# Patient Record
Sex: MALE | Race: WHITE | Employment: FULL TIME | URBAN - METROPOLITAN AREA
[De-identification: names, ages, dates, MRNs, and addresses within clinical notes are randomized per-mention and may not be internally consistent; named-entity substitution may affect disease eponyms.]

---

## 2018-06-13 ENCOUNTER — OFFICE VISIT (OUTPATIENT)
Dept: FAMILY MEDICINE CLINIC | Age: 38
End: 2018-06-13

## 2018-06-13 ENCOUNTER — HOSPITAL ENCOUNTER (OUTPATIENT)
Dept: LAB | Age: 38
Discharge: HOME | End: 2018-06-13
Payer: COMMERCIAL

## 2018-06-13 VITALS
BODY MASS INDEX: 38.41 KG/M2 | HEART RATE: 57 BPM | SYSTOLIC BLOOD PRESSURE: 130 MMHG | TEMPERATURE: 97.7 F | RESPIRATION RATE: 17 BRPM | DIASTOLIC BLOOD PRESSURE: 80 MMHG | WEIGHT: 239 LBS | HEIGHT: 66 IN | OXYGEN SATURATION: 99 %

## 2018-06-13 DIAGNOSIS — R10.84 GENERALIZED ABDOMINAL PAIN: ICD-10-CM

## 2018-06-13 DIAGNOSIS — Z13.29 SCREENING FOR THYROID DISORDER: ICD-10-CM

## 2018-06-13 DIAGNOSIS — Z12.5 SCREENING FOR PROSTATE CANCER: ICD-10-CM

## 2018-06-13 DIAGNOSIS — Z13.1 SCREENING FOR DIABETES MELLITUS: ICD-10-CM

## 2018-06-13 DIAGNOSIS — R19.5 CHANGE IN STOOL: ICD-10-CM

## 2018-06-13 DIAGNOSIS — Z13.0 SCREENING FOR DEFICIENCY ANEMIA: ICD-10-CM

## 2018-06-13 DIAGNOSIS — Z13.220 SCREENING FOR HYPERLIPIDEMIA: ICD-10-CM

## 2018-06-13 DIAGNOSIS — Z00.00 VISIT FOR WELL MAN HEALTH CHECK: Primary | ICD-10-CM

## 2018-06-13 PROBLEM — E66.01 SEVERE OBESITY (BMI 35.0-39.9): Status: ACTIVE | Noted: 2018-06-13

## 2018-06-13 PROBLEM — J30.2 SEASONAL ALLERGIC RHINITIS: Status: ACTIVE | Noted: 2018-06-13

## 2018-06-13 PROBLEM — K21.9 GASTROESOPHAGEAL REFLUX DISEASE WITHOUT ESOPHAGITIS: Status: ACTIVE | Noted: 2018-06-13

## 2018-06-13 LAB
ALBUMIN SERPL-MCNC: 3.5 G/DL (ref 3.4–5)
ALBUMIN/GLOB SERPL: 1.1 {RATIO} (ref 0.8–1.7)
ALP SERPL-CCNC: 83 U/L (ref 45–117)
ALT SERPL-CCNC: 31 U/L (ref 16–61)
ANION GAP SERPL CALC-SCNC: 10 MMOL/L (ref 3–18)
AST SERPL-CCNC: 13 U/L (ref 15–37)
BILIRUB SERPL-MCNC: 0.4 MG/DL (ref 0.2–1)
BUN SERPL-MCNC: 21 MG/DL (ref 7–18)
BUN/CREAT SERPL: 19 (ref 12–20)
CALCIUM SERPL-MCNC: 8.5 MG/DL (ref 8.5–10.1)
CHLORIDE SERPL-SCNC: 104 MMOL/L (ref 100–108)
CHOLEST SERPL-MCNC: 177 MG/DL
CO2 SERPL-SCNC: 26 MMOL/L (ref 21–32)
CREAT SERPL-MCNC: 1.1 MG/DL (ref 0.6–1.3)
GLOBULIN SER CALC-MCNC: 3.1 G/DL (ref 2–4)
GLUCOSE SERPL-MCNC: 86 MG/DL (ref 74–99)
HCT VFR BLD AUTO: 45 % (ref 36–48)
HDLC SERPL-MCNC: 41 MG/DL (ref 40–60)
HDLC SERPL: 4.3 {RATIO} (ref 0–5)
HGB BLD-MCNC: 14.7 G/DL (ref 13–16)
LDLC SERPL CALC-MCNC: 121.2 MG/DL (ref 0–100)
LIPID PROFILE,FLP: ABNORMAL
POTASSIUM SERPL-SCNC: 4.2 MMOL/L (ref 3.5–5.5)
PROT SERPL-MCNC: 6.6 G/DL (ref 6.4–8.2)
PSA SERPL-MCNC: 0.2 NG/ML (ref 0–4)
SODIUM SERPL-SCNC: 140 MMOL/L (ref 136–145)
TRIGL SERPL-MCNC: 74 MG/DL (ref ?–150)
TSH SERPL DL<=0.05 MIU/L-ACNC: 2.15 UIU/ML (ref 0.36–3.74)
VLDLC SERPL CALC-MCNC: 14.8 MG/DL

## 2018-06-13 PROCEDURE — 84153 ASSAY OF PSA TOTAL: CPT | Performed by: NURSE PRACTITIONER

## 2018-06-13 PROCEDURE — 85018 HEMOGLOBIN: CPT | Performed by: NURSE PRACTITIONER

## 2018-06-13 PROCEDURE — 80053 COMPREHEN METABOLIC PANEL: CPT | Performed by: NURSE PRACTITIONER

## 2018-06-13 PROCEDURE — 84443 ASSAY THYROID STIM HORMONE: CPT | Performed by: NURSE PRACTITIONER

## 2018-06-13 PROCEDURE — 80061 LIPID PANEL: CPT | Performed by: NURSE PRACTITIONER

## 2018-06-13 PROCEDURE — 36415 COLL VENOUS BLD VENIPUNCTURE: CPT | Performed by: NURSE PRACTITIONER

## 2018-06-13 RX ORDER — OMEPRAZOLE 10 MG/1
10 CAPSULE, DELAYED RELEASE ORAL DAILY
COMMUNITY
End: 2018-11-14 | Stop reason: ALTCHOICE

## 2018-06-13 RX ORDER — LORATADINE 10 MG/1
10 TABLET ORAL DAILY
COMMUNITY
End: 2022-03-22 | Stop reason: ALTCHOICE

## 2018-06-13 NOTE — MR AVS SNAPSHOT
[facility] 
 
 
 [address] [address] [address] [facility] [address] 
[phone] Patient: Shaquille Newby MRN: [id number] AWH:[birth year] Visit Information Date & Time Provider Department Dept. Phone Encounter #  
 6/13/2018 11:45 AM Mike Mckeon NP Garett Santos [address] [facility] [id number] Upcoming Health Maintenance Date Due DTaP/Tdap/Td series (1 - Tdap) 7/13/2003 Influenza Age 5 to Adult 8/1/2018 Allergies as of 6/13/2018  Review Complete On: 6/13/2018 By: Mike Mckeon NP No Known Allergies Current Immunizations  Never Reviewed No immunizations on file. Not reviewed this visit You Were Diagnosed With   
  
 Codes Comments Visit for well man health check    -  Primary ICD-10-CM: Z00.00 ICD-9-CM: V70.0 Screening for prostate cancer     ICD-10-CM: Z12.5 ICD-9-CM: V76.44 Screening for deficiency anemia     ICD-10-CM: Z13.0 ICD-9-CM: V78.1 Screening for thyroid disorder     ICD-10-CM: Z13.29 ICD-9-CM: V77.0 Screening for diabetes mellitus     ICD-10-CM: Z13.1 ICD-9-CM: V77.1 Screening for hyperlipidemia     ICD-10-CM: Z13.220 ICD-9-CM: V77.91 Generalized abdominal pain     ICD-10-CM: R10.84 ICD-9-CM: 789.07 Change in stool     ICD-10-CM: R19.5 ICD-9-CM: 792.1 Vitals BP Pulse Temp Resp Height(growth percentile) Weight(growth percentile) 130/80 (BP 1 Location: Left arm, BP Patient Position: Sitting) (!) 57 97.7 °F (36.5 °C) (Oral) 17 5' 6\" (1.676 m) 239 lb (108.4 kg) SpO2 BMI Smoking Status 99% 38.58 kg/m2 Never Smoker Vitals History BMI and BSA Data Body Mass Index Body Surface Area 38.58 kg/m 2 2.25 m 2 Preferred Pharmacy Pharmacy Name Phone [address] [email or web address] [address] [facility] [place], [address] [facility] [phone] Your Updated Medication List  
  
   
 This list is accurate as of 6/13/18 12:18 PM.  Always use your most recent med list.  
  
  
  
  
 CLARITIN 10 mg tablet Generic drug:  loratadine Take 10 mg by mouth. PriLOSEC 10 mg capsule Generic drug:  omeprazole Take 10 mg by mouth daily. To-Do List   
 06/13/2018 Lab:  HGB & HCT   
  
 06/13/2018 Lab:  LIPID PANEL   
  
 06/13/2018 Lab:  METABOLIC PANEL, COMPREHENSIVE   
  
 06/13/2018 Lab:  PSA, DIAGNOSTIC (PROSTATE SPECIFIC AG)   
  
 06/13/2018 Lab:  TSH 3RD GENERATION   
  
 06/13/2018 Imaging:  US ABD COMP [facility] & HEALTH SERVICES! [employer] introduces [employer] patient portal. Now you can access parts of your medical record, email your doctor's office, and request medication refills online. 1. In your internet browser, go to [email or web address] 2. Click on the First Time User? Click Here link in the Sign In box. You will see the New Member Sign Up page. 3. Enter your [employer] Access Code exactly as it appears below. You will not need to use this code after youve completed the sign-up process. If you do not sign up before the expiration date, you must request a new code. · [employer] Access Code: [email or web address] Expires: 9/11/2018 11:48 AM 
 
4. Enter the last four digits of your Social Security Number (xxxx) and Date of Birth (mm/dd/yyyy) as indicated and click Submit. You will be taken to the next sign-up page. 5. Create a [employer] ID. This will be your [employer] login ID and cannot be changed, so think of one that is secure and easy to remember. 6. Create a [employer] password. You can change your password at any time. 7. Enter your Password Reset Question and Answer. This can be used at a later time if you forget your password. 8. Enter your e-mail address. You will receive e-mail notification when new information is available in [address] 9. Click Sign Up.  You can now view and download portions of your medical record. 10. Click the Download Summary menu link to download a portable copy of your medical information. If you have questions, please visit the Frequently Asked Questions section of the [employer] website. Remember, [employer] is NOT to be used for urgent needs. For medical emergencies, dial 911. Now available from your iPhone and Android! Please provide this summary of care documentation to your next provider. Your primary care clinician is listed as Mary Suarez. If you have any questions after today's visit, please call [phone].

## 2018-06-13 NOTE — PROGRESS NOTES
Chief Complaint   Patient presents with    Well Male     1. Have you been to the ER, urgent care clinic since your last visit? Hospitalized since your last visit? No    2. Have you seen or consulted any other health care providers outside of the [address] [place] [address] since your last visit? Include any pap smears or colon screening. No      Subjective:   Ledy Yang is a 30 y.o. male presenting for his annual checkup. ROS:  Feeling well. No dyspnea or chest pain on exertion. No abdominal pain, change in bowel habits, black or bloody stools. No urinary tract or prostatic symptoms. No neurological complaints. Specific concerns today: Pt states he urinates freq. Pt states on and off he has to go to the bathroom for a bowel movement and he gets severe cramps. He has had part of his colon removed due to diverticulitis. .    Patient Active Problem List    Diagnosis Date Noted    Severe obesity (BMI 35.0-39.9) ([facility] [address] 06/13/2018    Gastroesophageal reflux disease without esophagitis 06/13/2018    Seasonal allergic rhinitis 06/13/2018     Current Outpatient Prescriptions   Medication Sig Dispense Refill    omeprazole (PRILOSEC) 10 mg capsule Take 10 mg by mouth daily.  loratadine (CLARITIN) 10 mg tablet Take 10 mg by mouth. No Known Allergies  No past medical history on file. No past surgical history on file. No family history on file. Social History   Substance Use Topics    Smoking status: Never Smoker    Smokeless tobacco: Current User    Alcohol use No      Review of Systems   Constitutional: Negative. HENT: Negative. Eyes: Negative. Respiratory: Negative. Cardiovascular: Negative. Gastrointestinal: Positive for abdominal pain (cramps) and diarrhea. Genitourinary: Positive for frequency. Musculoskeletal: Negative. Skin: Negative. Neurological: Negative. Endo/Heme/Allergies: Negative. Psychiatric/Behavioral: Negative.            Objective:     Visit Vitals    /80 (BP 1 Location: Left arm, BP Patient Position: Sitting)    Pulse (!) 57    Temp 97.7 °F (36.5 °C)    Resp 17    Ht 5' 6\" (1.676 m)    Wt 239 lb (108.4 kg)    SpO2 99%    BMI 38.58 kg/m2     The patient appears well, alert, oriented x 3, in no distress. ENT normal.  Neck supple. No adenopathy or thyromegaly. ELOINA. Lungs are clear, good air entry, no wheezes, rhonchi or rales. S1 and S2 normal, no murmurs, regular rate and rhythm. Abdomen is soft without tenderness, guarding, mass or organomegaly. Extremities show no edema, normal peripheral pulses. Neurological is normal without focal findings. Assessment/Plan:   healthy adult male  lose weight, routine labs ordered. ICD-10-CM ICD-9-CM    1. Visit for well man health check Z00.00 V70.0    2. Screening for prostate cancer Z12.5 V76.44 PSA, DIAGNOSTIC (PROSTATE SPECIFIC AG)   3. Screening for deficiency anemia Z13.0 V78.1 HGB & HCT   4. Screening for thyroid disorder Z13.29 V77.0 TSH 3RD GENERATION   5. Screening for diabetes mellitus U91.1 N39.4 METABOLIC PANEL, COMPREHENSIVE   6. Screening for hyperlipidemia Z13.220 V77.91 LIPID PANEL   7. Generalized abdominal pain R10.84 789.07 US ABD COMP   8. Change in stool R19.5 792.1 US ABD COMP   . PLAN:  We discussed his past history of diverticulitis and surgery and whether that can cause some of his symptoms. We talked about using flomax to help with urinary symptoms. Pt given after visit summary.

## 2018-06-13 NOTE — PROGRESS NOTES
HISTORY OF PRESENT ILLNESS  Dale Roth is a 30 y.o. male.   Patient presents for well male physical.    HPI    ROS  Visit Vitals    /80 (BP 1 Location: Left arm, BP Patient Position: Sitting)    Pulse (!) 57    Temp 97.7 °F (36.5 °C) (Oral)    Resp 17    Ht 5' 6\" (1.676 m)    Wt 239 lb (108.4 kg)    SpO2 99%    BMI 38.58 kg/m2       Physical Exam    ASSESSMENT and PLAN  {ASSESSMENT/PLAN:25977}

## 2018-06-14 ENCOUNTER — HOSPITAL ENCOUNTER (OUTPATIENT)
Dept: ULTRASOUND IMAGING | Age: 38
Discharge: HOME | End: 2018-06-14
Attending: NURSE PRACTITIONER
Payer: COMMERCIAL

## 2018-06-14 DIAGNOSIS — R19.5 CHANGE IN STOOL: ICD-10-CM

## 2018-06-14 DIAGNOSIS — R10.84 GENERALIZED ABDOMINAL PAIN: ICD-10-CM

## 2018-06-14 PROCEDURE — 76700 US EXAM ABDOM COMPLETE: CPT

## 2018-06-14 NOTE — PROGRESS NOTES
Please advise Pt that his PSA is normal.  There is no signs of anemia  His TSH is in normal range. His kidney and liver functions are normal.  His cholesterol numbers are fine. His LDL is 121 which is not too bad.

## 2018-06-15 ENCOUNTER — TELEPHONE (OUTPATIENT)
Dept: FAMILY MEDICINE CLINIC | Age: 38
End: 2018-06-15

## 2018-06-15 NOTE — TELEPHONE ENCOUNTER
----- Message from Lynsey Tovar NP sent at 6/14/2018 12:13 PM EDT -----  Please advise Pt that his US is normal. His Gallbladder is normal.    Please advise Pt that his PSA is normal.   There is no signs of anemia   His TSH is in normal range. His kidney and liver functions are normal.   His cholesterol numbers are fine. His LDL is 121 which is not too bad.

## 2018-06-15 NOTE — TELEPHONE ENCOUNTER
Pt returned call and given message:    Pt verbalized understanding    ----- Message from Christian Murrieta NP sent at 6/14/2018 12:13 PM EDT -----  Please advise Pt that his US is normal. His Gallbladder is normal.     Please advise Pt that his PSA is normal.   There is no signs of anemia   His TSH is in normal range. His kidney and liver functions are normal.   His cholesterol numbers are fine.  His LDL is 121 which is not too bad.

## 2018-08-08 ENCOUNTER — OFFICE VISIT (OUTPATIENT)
Dept: FAMILY MEDICINE CLINIC | Age: 38
End: 2018-08-08

## 2018-08-08 VITALS
OXYGEN SATURATION: 100 % | DIASTOLIC BLOOD PRESSURE: 77 MMHG | BODY MASS INDEX: 38.57 KG/M2 | SYSTOLIC BLOOD PRESSURE: 125 MMHG | HEART RATE: 56 BPM | RESPIRATION RATE: 16 BRPM | WEIGHT: 240 LBS | HEIGHT: 66 IN | TEMPERATURE: 98 F

## 2018-08-08 DIAGNOSIS — M25.511 ACUTE PAIN OF RIGHT SHOULDER: ICD-10-CM

## 2018-08-08 DIAGNOSIS — F43.0 ACUTE REACTION TO STRESS: Primary | ICD-10-CM

## 2018-08-08 RX ORDER — NAPROXEN SODIUM 550 MG/1
550 TABLET ORAL 2 TIMES DAILY WITH MEALS
Qty: 30 TAB | Refills: 0 | Status: SHIPPED | OUTPATIENT
Start: 2018-08-08 | End: 2018-11-14 | Stop reason: ALTCHOICE

## 2018-08-08 RX ORDER — ESCITALOPRAM OXALATE 10 MG/1
10 TABLET ORAL DAILY
Qty: 30 TAB | Refills: 5 | Status: SHIPPED | OUTPATIENT
Start: 2018-08-08 | End: 2018-09-05 | Stop reason: SDUPTHER

## 2018-08-08 RX ORDER — ORPHENADRINE CITRATE 100 MG/1
100 TABLET, EXTENDED RELEASE ORAL 2 TIMES DAILY
Qty: 30 TAB | Refills: 0 | Status: SHIPPED | OUTPATIENT
Start: 2018-08-08 | End: 2018-11-14 | Stop reason: ALTCHOICE

## 2018-08-08 NOTE — MR AVS SNAPSHOT
[address] [address] [address] [facility] [address] 
[phone] Patient: Artie Cabrera MRN: [id number] QHO:[birth year]10 Visit Information Date & Time Provider Department Dept. Phone Encounter #  
 8/8/2018 10:45 AM IRENE Ashraf Bebo [facility] [id number] Follow-up Instructions Return in about 4 weeks (around 9/5/2018). Upcoming Health Maintenance Date Due DTaP/Tdap/Td series (1 - Tdap) 7/13/2003 Influenza Age 5 to Adult 8/1/2018 Allergies as of 8/8/2018  Review Complete On: 8/8/2018 By: Nichole Roberson LPN No Known Allergies Current Immunizations  Never Reviewed No immunizations on file. Not reviewed this visit You Were Diagnosed With   
  
 Codes Comments Acute reaction to stress    -  Primary ICD-10-CM: F43.0 ICD-9-CM: 308. 9 Acute pain of right shoulder     ICD-10-CM: M25.511 ICD-9-CM: 719.41 Vitals BP Pulse Temp Resp Height(growth percentile) Weight(growth percentile) 125/77 (BP 1 Location: Left arm, BP Patient Position: Sitting) (!) 56 98 °F (36.7 °C) (Oral) 16 5' 6\" (1.676 m) 240 lb (108.9 kg) SpO2 BMI Smoking Status 100% 38.74 kg/m2 Never Smoker BMI and BSA Data Body Mass Index Body Surface Area 38.74 kg/m 2 2.25 m 2 Preferred Pharmacy Pharmacy Name Phone [facility] Wicko [address] [place] [address], [address] [place] [address] [phone] Your Updated Medication List  
  
   
This list is accurate as of 8/8/18 11:10 AM.  Always use your most recent med list.  
  
  
  
  
 CLARITIN 10 mg tablet Generic drug:  loratadine Take 10 mg by mouth.  
  
 escitalopram oxalate 10 mg tablet Commonly known as:  Hanna María Elena Take 1 Tab by mouth daily. naproxen sodium 550 mg tablet Commonly known as:  Felipe Faith DS Take 1 Tab by mouth two (2) times daily (with meals). orphenadrine citrate 100 mg sr tablet Commonly known as:  NORFLEX Take 1 Tab by mouth two (2) times a day. PriLOSEC 10 mg capsule Generic drug:  omeprazole Take 10 mg by mouth daily. Prescriptions Sent to Pharmacy Refills  
 escitalopram oxalate (LEXAPRO) 10 mg tablet 5 Sig: Take 1 Tab by mouth daily. Class: Normal  
 Pharmacy: [address] [address] [facility], [address] [facility] Ph #: [phone] Route: Oral  
 naproxen sodium (ANAPROX DS) 550 mg tablet 0 Sig: Take 1 Tab by mouth two (2) times daily (with meals). Class: Normal  
 Pharmacy: [address], [address] [facility] Ph #: [phone] Route: Oral  
 orphenadrine citrate (NORFLEX) 100 mg sr tablet 0 Sig: Take 1 Tab by mouth two (2) times a day. Class: Normal  
 Pharmacy: [address], [address] [facility] Ph #: [phone] Route: Oral  
  
Follow-up Instructions Return in about 4 weeks (around 9/5/2018). Introducing [facility] & HEALTH SERVICES! [employer] introduces [employer] patient portal. Now you can access parts of your medical record, email your doctor's office, and request medication refills online. 1. In your internet browser, go to [email or web address] 2. Click on the First Time User? Click Here link in the Sign In box. You will see the New Member Sign Up page. 3. Enter your [employer] Access Code exactly as it appears below. You will not need to use this code after youve completed the sign-up process. If you do not sign up before the expiration date, you must request a new code. · [employer] Access Code: [email or web address] Expires: 9/11/2018 11:48 AM 
 
4. Enter the last four digits of your Social Security Number (xxxx) and Date of Birth (mm/dd/yyyy) as indicated and click Submit. You will be taken to the next sign-up page. 5. Create a [employer] ID. This will be your [employer] login ID and cannot be changed, so think of one that is secure and easy to remember. 6. Create a [employer] password. You can change your password at any time. 7. Enter your Password Reset Question and Answer. This can be used at a later time if you forget your password. 8. Enter your e-mail address. You will receive e-mail notification when new information is available in [address] 9. Click Sign Up. You can now view and download portions of your medical record. 10. Click the Download Summary menu link to download a portable copy of your medical information. If you have questions, please visit the Frequently Asked Questions section of the [employer] website. Remember, [employer] is NOT to be used for urgent needs. For medical emergencies, dial 911. Now available from your iPhone and Android! Please provide this summary of care documentation to your next provider. Your primary care clinician is listed as Yas Navarrete. If you have any questions after today's visit, please call [phone].

## 2018-08-08 NOTE — PROGRESS NOTES
HISTORY OF PRESENT ILLNESS  Martin Tran is a 41 y.o. male. Patient presents for two things  Anxiety and shoulder pain. HPI  Pt is seeing a counselor for something he witnessed at work and [employer] comp is involved. His counselor suggested that he try a medication and to talk with his PCP regarding this. He saw a man commit suicide and that has left him very unsettled. He also has right shoulder pain, especially when he makes certain movements going backwards, he sleeps with his head on his bent arm on that side. Review of Systems   Constitutional: Negative. HENT: Negative. Respiratory: Negative. Cardiovascular: Negative. Musculoskeletal: Positive for joint pain (right shoulder). Psychiatric/Behavioral: The patient is nervous/anxious. Visit Vitals    /77 (BP 1 Location: Left arm, BP Patient Position: Sitting)    Pulse (!) 56    Temp 98 °F (36.7 °C) (Oral)    Resp 16    Ht 5' 6\" (1.676 m)    Wt 240 lb (108.9 kg)    SpO2 100%    BMI 38.74 kg/m2       Physical Exam   Constitutional: He appears well-developed. No distress. Cardiovascular: Normal rate, regular rhythm and normal heart sounds. No murmur heard. Pulmonary/Chest: Effort normal and breath sounds normal. No respiratory distress. He has no wheezes. He has no rales. Musculoskeletal:        Right shoulder: He exhibits tenderness. He exhibits normal range of motion, no swelling, no effusion and no crepitus. Left shoulder: Normal.        Cervical back: He exhibits spasm. He exhibits normal range of motion. Psychiatric: His speech is normal and behavior is normal. Judgment and thought content normal. His mood appears anxious. Cognition and memory are normal.       ASSESSMENT and PLAN    ICD-10-CM ICD-9-CM    1. Acute reaction to stress F43.0 308.9 escitalopram oxalate (LEXAPRO) 10 mg tablet   2.  Acute pain of right shoulder M25.511 719.41 naproxen sodium (ANAPROX DS) 550 mg tablet      orphenadrine citrate (NORFLEX) 100 mg sr tablet     PLAN:  Right shoulder:  Pt asked to take anaprox and norflex bid for 7-10days with food. Heat therapy    Acute reaction to stress: We discussed treatment options, side effects of medication. I asked Pt to RTC in 4 weeks for med evaluation.     Pt given after visit summary  Pt is to call if there is no improvement

## 2018-08-08 NOTE — PROGRESS NOTES
Chief Complaint   Patient presents with    Shoulder Pain     right shoulder pain x 1 week    Medication Evaluation     Psych wanting patient to talk to PCP about taking a new prescription      1. Have you been to the ER, urgent care clinic since your last visit? Hospitalized since your last visit? No    2. Have you seen or consulted any other health care providers outside of the [address] [place] [address] since your last visit? Include any pap smears or colon screening.  No

## 2018-09-05 ENCOUNTER — OFFICE VISIT (OUTPATIENT)
Dept: FAMILY MEDICINE CLINIC | Age: 38
End: 2018-09-05

## 2018-09-05 VITALS
SYSTOLIC BLOOD PRESSURE: 125 MMHG | BODY MASS INDEX: 39.57 KG/M2 | HEART RATE: 57 BPM | DIASTOLIC BLOOD PRESSURE: 84 MMHG | TEMPERATURE: 97 F | OXYGEN SATURATION: 97 % | RESPIRATION RATE: 16 BRPM | HEIGHT: 66 IN | WEIGHT: 246.2 LBS

## 2018-09-05 DIAGNOSIS — F43.0 ACUTE REACTION TO STRESS: Primary | ICD-10-CM

## 2018-09-05 DIAGNOSIS — M25.511 ACUTE PAIN OF RIGHT SHOULDER: ICD-10-CM

## 2018-09-05 DIAGNOSIS — R42 VERTIGO: ICD-10-CM

## 2018-09-05 DIAGNOSIS — N52.2 DRUG-INDUCED ERECTILE DYSFUNCTION: ICD-10-CM

## 2018-09-05 DIAGNOSIS — F51.01 PRIMARY INSOMNIA: ICD-10-CM

## 2018-09-05 DIAGNOSIS — L20.84 INTRINSIC ECZEMA: ICD-10-CM

## 2018-09-05 DIAGNOSIS — J01.00 ACUTE NON-RECURRENT MAXILLARY SINUSITIS: ICD-10-CM

## 2018-09-05 RX ORDER — MECLIZINE HYDROCHLORIDE 25 MG/1
25 TABLET ORAL
Qty: 30 TAB | Refills: 0 | Status: SHIPPED | OUTPATIENT
Start: 2018-09-05 | End: 2018-09-15

## 2018-09-05 RX ORDER — CLOBETASOL PROPIONATE 0.5 MG/G
EMULSION TOPICAL 2 TIMES DAILY
Qty: 15 G | Refills: 0 | Status: SHIPPED | OUTPATIENT
Start: 2018-09-05 | End: 2018-09-05 | Stop reason: ALTCHOICE

## 2018-09-05 RX ORDER — TADALAFIL 20 MG/1
TABLET ORAL
Qty: 9 TAB | Refills: 5 | Status: SHIPPED | OUTPATIENT
Start: 2018-09-05 | End: 2019-07-31

## 2018-09-05 RX ORDER — CEFUROXIME AXETIL 500 MG/1
500 TABLET ORAL 2 TIMES DAILY
Qty: 20 TAB | Refills: 0 | Status: SHIPPED | OUTPATIENT
Start: 2018-09-05 | End: 2018-11-14 | Stop reason: ALTCHOICE

## 2018-09-05 RX ORDER — CLOBETASOL PROPIONATE 0.5 MG/G
OINTMENT TOPICAL 2 TIMES DAILY
Qty: 15 G | Refills: 0 | Status: SHIPPED | OUTPATIENT
Start: 2018-09-05 | End: 2018-09-05 | Stop reason: ALTCHOICE

## 2018-09-05 RX ORDER — CLOBETASOL PROPIONATE 0.46 MG/ML
SOLUTION TOPICAL
Qty: 50 ML | Refills: 2 | Status: SHIPPED | OUTPATIENT
Start: 2018-09-05 | End: 2018-09-17 | Stop reason: SDUPTHER

## 2018-09-05 RX ORDER — ESCITALOPRAM OXALATE 10 MG/1
10 TABLET ORAL DAILY
Qty: 90 TAB | Refills: 1 | Status: SHIPPED | OUTPATIENT
Start: 2018-09-05 | End: 2018-10-04 | Stop reason: SDUPTHER

## 2018-09-05 NOTE — PATIENT INSTRUCTIONS

## 2018-09-05 NOTE — MR AVS SNAPSHOT
[facility] 
 
 
 [address] [address] [facility] [address] 
[phone] Patient: Emily Hartley MRN: [id number] TPN:[id number]/74/[birth year]43 Visit Information Date & Time Provider Department Dept. Phone Encounter #  
 9/5/2018  8:00 AM Atilio Irwin,  [facility] [id number] Follow-up Instructions Return in about 3 months (around 12/5/2018). Upcoming Health Maintenance Date Due DTaP/Tdap/Td series (1 - Tdap) 7/13/2003 Influenza Age 5 to Adult 8/1/2018 Allergies as of 9/5/2018  Review Complete On: 9/5/2018 By: Ashlee Jaime LPN No Known Allergies Current Immunizations  Never Reviewed No immunizations on file. Not reviewed this visit You Were Diagnosed With   
  
 Codes Comments Acute reaction to stress    -  Primary ICD-10-CM: F43.0 ICD-9-CM: 308. 9 Acute pain of right shoulder     ICD-10-CM: M25.511 ICD-9-CM: 719.41 Primary insomnia     ICD-10-CM: F51.01 
ICD-9-CM: 307.42 Acute non-recurrent maxillary sinusitis     ICD-10-CM: J01.00 ICD-9-CM: 461.0 Intrinsic eczema     ICD-10-CM: L20.84 ICD-9-CM: 691.8 Vertigo     ICD-10-CM: A37 ICD-9-CM: 780.4 Drug-induced erectile dysfunction     ICD-10-CM: N52.2 ICD-9-CM: 607.84, E980.5 Vitals BP Pulse Temp Resp Height(growth percentile) Weight(growth percentile) 125/84 (BP 1 Location: Left arm) (!) 57 97 °F (36.1 °C) 16 5' 6\" (1.676 m) 246 lb 3.2 oz (111.7 kg) SpO2 BMI Smoking Status 97% 39.74 kg/m2 Never Smoker BMI and BSA Data Body Mass Index Body Surface Area 39.74 kg/m 2 2.28 m 2 Preferred Pharmacy Pharmacy Name Phone 056 [address], [address] [place] [address] [phone] Your Updated Medication List  
  
   
This list is accurate as of 9/5/18  8:32 AM.  Always use your most recent med list.  
  
  
  
  
 cefUROXime 500 mg tablet Commonly known as:  CEFTIN Take 1 Tab by mouth two (2) times a day. CLARITIN 10 mg tablet Generic drug:  loratadine Take 10 mg by mouth.  
  
 clobetasol 0.05 % ointment Commonly known as:  Bettina Dame Apply  to affected area two (2) times a day. clobetasol-emollient 0.05 % topical cream  
Commonly known as:  Susanne Lowell Apply  to affected area two (2) times a day. escitalopram oxalate 10 mg tablet Commonly known as:  Wyn Cookie Take 1 Tab by mouth daily. meclizine 25 mg tablet Commonly known as:  ANTIVERT Take 1 Tab by mouth three (3) times daily as needed for up to 10 days. naproxen sodium 550 mg tablet Commonly known as:  Rebecca Latus DS Take 1 Tab by mouth two (2) times daily (with meals). orphenadrine citrate 100 mg sr tablet Commonly known as:  NORFLEX Take 1 Tab by mouth two (2) times a day. PriLOSEC 10 mg capsule Generic drug:  omeprazole Take 10 mg by mouth daily. tadalafil 20 mg tablet Commonly known as:  CIALIS Take every 3 days Prescriptions Sent to Pharmacy Refills  
 cefUROXime (CEFTIN) 500 mg tablet 0 Sig: Take 1 Tab by mouth two (2) times a day. Class: Normal  
 Pharmacy: [facility] DR BREEZY STEEN [address] [place] [address] [facility] Ph #: [phone] Route: Oral  
 meclizine (ANTIVERT) 25 mg tablet 0 Sig: Take 1 Tab by mouth three (3) times daily as needed for up to 10 days. Class: Normal  
 Pharmacy: [facility] [address] BREEZY STEEN [address] [place] [address] [facility] Ph #: [phone] Route: Oral  
 clobetasol (TEMOVATE) 0.05 % ointment 0 Sig: Apply  to affected area two (2) times a day. Class: Normal  
 Pharmacy: [facility] DR BREEZY STEEN [address] [place] [address] [facility] Ph #: [phone] Route: Topical  
 clobetasol-emollient (TEMOVATE-E) 0.05 % topical cream 0 Sig: Apply  to affected area two (2) times a day.   
 Class: Normal  
 Pharmacy: [facility] [address] BREEZY STEEN [address] [place] [address], [address] [place] [address] ROAD Ph #: [phone] Route: Topical  
 tadalafil (CIALIS) 20 mg tablet 5 Sig: Take every 3 days Class: Normal  
 Pharmacy: [facility] [address] BREEZY STEEN [address] [place] [address], [address] [facility] Ph #: [phone]  
 escitalopram oxalate (LEXAPRO) 10 mg tablet 1 Sig: Take 1 Tab by mouth daily. Class: Normal  
 Pharmacy: [facility] [address] BREEZY STEEN [address] [place] [address], [address] [place] [address] Ph #: [phone] Route: Oral  
  
Follow-up Instructions Return in about 3 months (around 12/5/2018). Patient Instructions A Healthy Lifestyle: Care Instructions Your Care Instructions A healthy lifestyle can help you feel good, stay at a healthy weight, and have plenty of energy for both work and play. A healthy lifestyle is something you can share with your whole family. A healthy lifestyle also can lower your risk for serious health problems, such as high blood pressure, heart disease, and diabetes. You can follow a few steps listed below to improve your health and the health of your family. Follow-up care is a key part of your treatment and safety. Be sure to make and go to all appointments, and call your doctor if you are having problems. It's also a good idea to know your test results and keep a list of the medicines you take. How can you care for yourself at home? · Do not eat too much sugar, fat, or fast foods. You can still have dessert and treats now and then. The goal is moderation. · Start small to improve your eating habits. Pay attention to portion sizes, drink less juice and soda pop, and eat more fruits and vegetables. ¨ Eat a healthy amount of food. A 3-ounce serving of meat, for example, is about the size of a deck of cards. Fill the rest of your plate with vegetables and whole grains. ¨ Limit the amount of soda and sports drinks you have every day.  Drink more water when you are thirsty. ¨ Eat at least 5 servings of fruits and vegetables every day. It may seem like a lot, but it is not hard to reach this goal. A serving or helping is 1 piece of fruit, 1 cup of vegetables, or 2 cups of leafy, raw vegetables. Have an apple or some carrot sticks as an afternoon snack instead of a candy bar. Try to have fruits and/or vegetables at every meal. 
· Make exercise part of your daily routine. You may want to start with simple activities, such as walking, bicycling, or slow swimming. Try to be active 30 to 60 minutes every day. You do not need to do all 30 to 60 minutes all at once. For example, you can exercise 3 times a day for 10 or 20 minutes. Moderate exercise is safe for most people, but it is always a good idea to talk to your doctor before starting an exercise program. 
· Keep moving. Beny Keita the lawn, work in the garden, or [employer]. Take the stairs instead of the elevator at work. · If you smoke, quit. People who smoke have an increased risk for heart attack, stroke, cancer, and other lung illnesses. Quitting is hard, but there are ways to boost your chance of quitting tobacco for good. ¨ Use nicotine gum, patches, or lozenges. ¨ Ask your doctor about stop-smoking programs and medicines. ¨ Keep trying. In addition to reducing your risk of diseases in the future, you will notice some benefits soon after you stop using tobacco. If you have shortness of breath or asthma symptoms, they will likely get better within a few weeks after you quit. · Limit how much alcohol you drink. Moderate amounts of alcohol (up to 2 drinks a day for men, 1 drink a day for women) are okay. But drinking too much can lead to liver problems, high blood pressure, and other health problems. Family health If you have a family, there are many things you can do together to improve your health. · Eat meals together as a family as often as possible. · Eat healthy foods. This includes fruits, vegetables, lean meats and dairy, and whole grains. · Include your family in your fitness plan. Most people think of activities such as jogging or tennis as the way to fitness, but there are many ways you and your family can be more active. Anything that makes you breathe hard and gets your heart pumping is exercise. Here are some tips: 
¨ Walk to do errands or to take your child to school or the bus. ¨ Go for a family bike ride after dinner instead of watching TV. Where can you learn more? Go to http://gabriellaCityNewsjuany.info/. Enter Q874 in the search box to learn more about \"A Healthy Lifestyle: Care Instructions. \" Current as of: December 7, 2017 Content Version: 11.7 © 7259-0361 [employer]. Care instructions adapted under license by [employer] (which disclaims liability or warranty for this information). If you have questions about a medical condition or this instruction, always ask your healthcare professional. [address] [address] any warranty or liability for your use of this information. Introducing [facility]! [employer] introduces AquaMobile patient portal. Now you can access parts of your medical record, email your doctor's office, and request medication refills online. 1. In your internet browser, go to [email or web address] 2. Click on the First Time User? Click Here link in the Sign In box. You will see the New Member Sign Up page. 3. Enter your AquaMobile Access Code exactly as it appears below. You will not need to use this code after youve completed the sign-up process. If you do not sign up before the expiration date, you must request a new code. · AquaMobile Access Code: [email or web address] Expires: 9/11/2018 11:48 AM 
 
4. Enter the last four digits of your Social Security Number (xxxx) and Date of Birth (mm/dd/yyyy) as indicated and click Submit.  You will be taken to the next sign-up page. 5. Create a [employer] ID. This will be your [employer] login ID and cannot be changed, so think of one that is secure and easy to remember. 6. Create a [employer] password. You can change your password at any time. 7. Enter your Password Reset Question and Answer. This can be used at a later time if you forget your password. 8. Enter your e-mail address. You will receive e-mail notification when new information is available in [address]. 9. Click Sign Up. You can now view and download portions of your medical record. 10. Click the Download Summary menu link to download a portable copy of your medical information. If you have questions, please visit the Frequently Asked Questions section of the [employer] website. Remember, [employer] is NOT to be used for urgent needs. For medical emergencies, dial 911. Now available from your iPhone and Android! Please provide this summary of care documentation to your next provider. Your primary care clinician is listed as Julia Germain. If you have any questions after today's visit, please call [phone].

## 2018-09-05 NOTE — PROGRESS NOTES
Pt is here for f/u on stress related incident    1. Have you been to the ER, urgent care clinic since your last visit? Hospitalized since your last visit? No    2. Have you seen or consulted any other health care providers outside of the [address] [place] [address] since your last visit? Include any pap smears or colon screening.  No

## 2018-09-05 NOTE — PROGRESS NOTES
HISTORY OF PRESENT ILLNESS  Kan Emmanuel is a 41 y.o. male. Patient presents for follow up. HPI  Pt states he is doing much better. His depression has improved but he has noted that when he has sex, he can't finish the \"job\". He only took the medicine for his shoulder once. He knows what aggravates it so he tries not to do those things. Pt states he has a hard time falling asleep but he is not having night richards. He has a history of vertigo. He has not  had an episode since last year. He had an episode last week and wondered if the medication can cause this. He took and over the counter meclizine which makes him sleep. Usually when he wakes up the vertigo has resolved. Not this last episode. He does note he has increase post nasal congestion. Review of Systems   Constitutional: Negative. Neurological: Positive for dizziness (recent episode of vertigo, the room was spinning around. he had to go to home from work. ). Psychiatric/Behavioral: Positive for depression (improved). Visit Vitals    /84 (BP 1 Location: Left arm)    Pulse (!) 57    Temp 97 °F (36.1 °C)    Resp 16    Ht 5' 6\" (1.676 m)    Wt 246 lb 3.2 oz (111.7 kg)    SpO2 97%    BMI 39.74 kg/m2       Physical Exam   Constitutional: He is oriented to person, place, and time. He appears well-developed. No distress. HENT:   Right Ear: A middle ear effusion is present. Left Ear: A middle ear effusion is present. Nose: Right sinus exhibits maxillary sinus tenderness. Left sinus exhibits maxillary sinus tenderness. Eyes: Conjunctivae and EOM are normal. Pupils are equal, round, and reactive to light. Right eye exhibits no discharge. Left eye exhibits no discharge. Cardiovascular: Normal rate, regular rhythm and normal heart sounds. No murmur heard. Pulmonary/Chest: Breath sounds normal. No respiratory distress. He has no wheezes. He has no rales. Neurological: He is alert and oriented to person, place, and time. No cranial nerve deficit. Psychiatric: He has a normal mood and affect. His behavior is normal. Judgment and thought content normal.       ASSESSMENT and PLAN    ICD-10-CM ICD-9-CM    1. Acute reaction to stress F43.0 308.9 escitalopram oxalate (LEXAPRO) 10 mg tablet   2. Acute pain of right shoulder M25.511 719.41    3. Primary insomnia F51.01 307.42    4. Acute non-recurrent maxillary sinusitis J01.00 461.0 cefUROXime (CEFTIN) 500 mg tablet   5. Intrinsic eczema L20.84 691.8 clobetasol (TEMOVATE) 0.05 % ointment      clobetasol-emollient (TEMOVATE-E) 0.05 % topical cream   6. Vertigo R42 780.4 meclizine (ANTIVERT) 25 mg tablet   7. Drug-induced erectile dysfunction N52.2 607.84 tadalafil (CIALIS) 20 mg tablet     E980.5      PLAN:  We discussed his depression and at this time, he will continue his current dose. We discussed his ED and that the SSRIs can cause this. We discussed treatment option and whether or not his insurance will cover this or not. I asked Pt to take the anaprox and norflex for one week and if shoulder symptoms persist, the next step PT. We discussed his sleep issues and I recommend that he try over the counter Melatonin 5-10mg and take this about 1/2 hour to an hour before bedtime. I asked Pt to RTC in 3 months for chronic care.   Pt is to call sooner with any concerns    Pt given after visit summary

## 2018-09-17 DIAGNOSIS — L20.84 INTRINSIC ECZEMA: ICD-10-CM

## 2018-09-17 RX ORDER — CLOBETASOL PROPIONATE 0.46 MG/ML
SOLUTION TOPICAL
Qty: 50 ML | Refills: 2 | Status: SHIPPED | OUTPATIENT
Start: 2018-09-17 | End: 2019-10-25

## 2018-10-04 ENCOUNTER — OFFICE VISIT (OUTPATIENT)
Dept: FAMILY MEDICINE CLINIC | Age: 38
End: 2018-10-04

## 2018-10-04 VITALS
OXYGEN SATURATION: 97 % | WEIGHT: 243 LBS | HEIGHT: 65 IN | SYSTOLIC BLOOD PRESSURE: 123 MMHG | BODY MASS INDEX: 40.48 KG/M2 | DIASTOLIC BLOOD PRESSURE: 81 MMHG | HEART RATE: 55 BPM | RESPIRATION RATE: 16 BRPM | TEMPERATURE: 98.1 F

## 2018-10-04 DIAGNOSIS — F43.0 ACUTE REACTION TO STRESS: ICD-10-CM

## 2018-10-04 RX ORDER — ESCITALOPRAM OXALATE 20 MG/1
20 TABLET ORAL DAILY
Qty: 90 TAB | Refills: 1 | Status: SHIPPED | OUTPATIENT
Start: 2018-10-04 | End: 2018-12-05 | Stop reason: ALTCHOICE

## 2018-10-04 NOTE — PROGRESS NOTES
Pt is here for depression, wants medication adjusted. Scored 11 on depression screening. 1. Have you been to the ER, urgent care clinic since your last visit? Hospitalized since your last visit? No    2. Have you seen or consulted any other health care providers outside of the [address] [place] [address] since your last visit? Include any pap smears or colon screening.  Yes Dr Eliberto Litten 10/3/18

## 2018-10-04 NOTE — PROGRESS NOTES
HISTORY OF PRESENT ILLNESS  Camila White is a 41 y.o. male. Patient presents for med check  Chief Complaint   Patient presents with    Depression     HPI  Pt had a rough week last week and would like to adjust the dose. Pt states his sleep has improved with the Magnesium and Melatonin. His sexual dysfunction seems to have worked its self out. Pt's mother has a history of seizures and there is questionable history of him having epilepsy as a child. Review of Systems   Constitutional: Negative. Psychiatric/Behavioral: Positive for depression. The patient has insomnia. Visit Vitals    /81 (BP 1 Location: Left arm)    Pulse (!) 55    Temp 98.1 °F (36.7 °C) (Oral)    Resp 16    Ht 5' 5\" (1.651 m)    Wt 243 lb (110.2 kg)    SpO2 97%    BMI 40.44 kg/m2     Physical Exam   Constitutional: He is oriented to person, place, and time. He appears well-developed. No distress. Cardiovascular: Normal rate, regular rhythm and normal heart sounds. No murmur heard. Pulmonary/Chest: Effort normal and breath sounds normal. No respiratory distress. He has no wheezes. He has no rales. Neurological: He is alert and oriented to person, place, and time. He has normal reflexes. Psychiatric: He has a normal mood and affect. His behavior is normal. Judgment and thought content normal.     ASSESSMENT and PLAN    ICD-10-CM ICD-9-CM    1. Acute reaction to stress F43.0 308.9 escitalopram oxalate (LEXAPRO) 20 mg tablet     PLAN:  We discussed his symptoms and we decided to increase his medication to Lexapro 20mg  I asked Pt to RTC in 6 months for chronic care, sooner with any concerns.     Pt given after visit summary

## 2018-10-04 NOTE — PATIENT INSTRUCTIONS
Recovering From Depression: Care Instructions  Your Care Instructions    Taking good care of yourself is important as you recover from depression. In time, your symptoms will fade as your treatment takes hold. Do not give up. Instead, focus your energy on getting better. Your mood will improve. It just takes some time. Focus on things that can help you feel better, such as being with friends and family, eating well, and getting enough rest. But take things slowly. Do not do too much too soon. You will begin to feel better gradually. Follow-up care is a key part of your treatment and safety. Be sure to make and go to all appointments, and call your doctor if you are having problems. It's also a good idea to know your test results and keep a list of the medicines you take. How can you care for yourself at home? Be realistic  · If you have a large task to do, break it up into smaller steps you can handle, and just do what you can. · You may want to put off important decisions until your depression has lifted. If you have plans that will have a major impact on your life, such as marriage, divorce, or a job change, try to wait a bit. Talk it over with friends and loved ones who can help you look at the overall picture first.  · Reaching out to people for help is important. Do not isolate yourself. Let your family and friends help you. Find someone you can trust and confide in, and talk to that person. · Be patient, and be kind to yourself. Remember that depression is not your fault and is not something you can overcome with willpower alone. Treatment is necessary for depression, just like for any other illness. Feeling better takes time, and your mood will improve little by little. Stay active  · Stay busy and get outside. Take a walk, or try some other light exercise. · Talk with your doctor about an exercise program. Exercise can help with mild depression. · Go to a movie or concert.  Take part in a [employer] activity or other social gathering. Go to a [employer] game. · Ask a friend to have dinner with you. Take care of yourself  · Eat a balanced diet with plenty of fresh fruits and vegetables, whole grains, and lean protein. If you have lost your appetite, eat small snacks rather than large meals. · Avoid drinking alcohol or using illegal drugs. Do not take medicines that have not been prescribed for you. They may interfere with medicines you may be taking for depression, or they may make your depression worse. · Take your medicines exactly as they are prescribed. You may start to feel better within 1 to 3 weeks of taking antidepressant medicine. But it can take as many as 6 to 8 weeks to see more improvement. If you have questions or concerns about your medicines, or if you do not notice any improvement by 3 weeks, talk to your doctor. · If you have any side effects from your medicine, tell your doctor. Antidepressants can make you feel tired, dizzy, or nervous. Some people have dry mouth, constipation, headaches, sexual problems, or diarrhea. Many of these side effects are mild and will go away on their own after you have been taking the medicine for a few weeks. Some may last longer. Talk to your doctor if side effects are bothering you too much. You might be able to try a different medicine. · Get enough sleep. If you have problems sleeping:  ¨ Go to bed at the same time every night, and get up at the same time every morning. ¨ Keep your bedroom dark and quiet. ¨ Do not exercise after 5:00 p.m. ¨ Avoid drinks with caffeine after 5:00 p.m. · Avoid sleeping pills unless they are prescribed by the doctor treating your depression. Sleeping pills may make you groggy during the day, and they may interact with other medicine you are taking. · If you have any other illnesses, such as diabetes, heart disease, or high blood pressure, make sure to continue with your treatment.  Tell your doctor about all of the medicines you take, including those with or without a prescription. · Keep the numbers for these national suicide hotlines: 7-277-591-TALK (7-616.153.7739) and 1-495-HNRUZJD (1-602.603.7956). If you or someone you know talks about suicide or feeling hopeless, get help right away. When should you call for help? Call 911 anytime you think you may need emergency care. For example, call if:    · You feel like hurting yourself or someone else.     · Someone you know has depression and is about to attempt or is attempting suicide.   [facility] your doctor now or seek immediate medical care if:    · You hear voices.     · Someone you know has depression and:  ¨ Starts to give away his or her possessions. ¨ Uses illegal drugs or drinks alcohol heavily. ¨ Talks or writes about death, including writing suicide notes or talking about guns, knives, or pills. ¨ Starts to spend a lot of time alone. ¨ Acts very aggressively or suddenly appears calm.    Watch closely for changes in your health, and be sure to contact your doctor if:    · You do not get better as expected. Where can you learn more? Go to http://gabriella-juany.info/. Enter E652 in the search box to learn more about \"Recovering From Depression: Care Instructions. \"  Current as of: December 7, 2017  Content Version: 11.8  © 2926-6748 [employer]. Care instructions adapted under license by [employer] (which disclaims liability or warranty for this information). If you have questions about a medical condition or this instruction, always ask your healthcare professional. [place] [address] any warranty or liability for your use of this information.

## 2018-10-04 NOTE — MR AVS SNAPSHOT
[facility] 
 
 
 [address] [place] [address] [address] [address] 
[phone] Patient: Johnnie Moscoso MRN: [id number] FBT:5/60/1877 Visit Information Date & Time Provider Department Dept. Phone Encounter #  
 10/4/2018 10:20 AM Nikki Blackburn NP [address] [place] [address] [id number] Follow-up Instructions Return in about 6 months (around 4/4/2019) for chronic care. Your Appointments 12/5/2018  8:00 AM  
Office Visit with Nikki Blackburn NP [facility] (AR[facility] Proctor) Appt Note: fu on stress [address] [facility] [address] [address]  
[phone]  
  
   
 [address],  [address] Upcoming Health Maintenance Date Due DTaP/Tdap/Td series (1 - Tdap) 7/13/2003 Influenza Age 5 to Adult 8/1/2018 Allergies as of 10/4/2018  Review Complete On: 10/4/2018 By: Shira Key LPN No Known Allergies Current Immunizations  Never Reviewed No immunizations on file. Not reviewed this visit You Were Diagnosed With   
  
 Codes Comments Acute reaction to stress     ICD-10-CM: F43.0 ICD-9-CM: 308. 9 Vitals BP Pulse Temp Resp Height(growth percentile) Weight(growth percentile) 123/81 (BP 1 Location: Left arm) (!) 55 98.1 °F (36.7 °C) (Oral) 16 5' 5\" (1.651 m) 243 lb (110.2 kg) SpO2 BMI Smoking Status 97% 40.44 kg/m2 Never Smoker BMI and BSA Data Body Mass Index Body Surface Area 40.44 kg/m 2 2.25 m 2 Preferred Pharmacy Pharmacy Name Phone [facility] [address] [place] [address], [address] [place] [phone] Your Updated Medication List  
  
   
This list is accurate as of 10/4/18 11:07 AM.  Always use your most recent med list.  
  
  
  
  
 cefUROXime 500 mg tablet Commonly known as:  CEFTIN Take 1 Tab by mouth two (2) times a day. CLARITIN 10 mg tablet Generic drug:  loratadine Take 10 mg by mouth.  
  
 clobetasol 0.05 % external solution Commonly known as:  Celi Bryant Use bid to affect area (scalp) for two weeks then decrease to twice a week for maintenance  
  
 escitalopram oxalate 20 mg tablet Commonly known as:  Radhameslan Danie Take 1 Tab by mouth daily. naproxen sodium 550 mg tablet Commonly known as:  Josué Finical DS Take 1 Tab by mouth two (2) times daily (with meals). orphenadrine citrate 100 mg sr tablet Commonly known as:  NORFLEX Take 1 Tab by mouth two (2) times a day. PriLOSEC 10 mg capsule Generic drug:  omeprazole Take 10 mg by mouth daily. tadalafil 20 mg tablet Commonly known as:  CIALIS Take every 3 days Prescriptions Sent to Pharmacy Refills  
 escitalopram oxalate (LEXAPRO) 20 mg tablet 1 Sig: Take 1 Tab by mouth daily. Class: Normal  
 Pharmacy: [address] [facility] [address] #: [phone] Route: Oral  
  
Follow-up Instructions Return in about 6 months (around 4/4/2019) for chronic care. Patient Instructions Recovering From Depression: Care Instructions Your Care Instructions Taking good care of yourself is important as you recover from depression. In time, your symptoms will fade as your treatment takes hold. Do not give up. Instead, focus your energy on getting better. Your mood will improve. It just takes some time. Focus on things that can help you feel better, such as being with friends and family, eating well, and getting enough rest. But take things slowly. Do not do too much too soon. You will begin to feel better gradually. Follow-up care is a key part of your treatment and safety. Be sure to make and go to all appointments, and call your doctor if you are having problems. It's also a good idea to know your test results and keep a list of the medicines you take. How can you care for yourself at home? Be realistic · If you have a large task to do, break it up into smaller steps you can handle, and just do what you can. · You may want to put off important decisions until your depression has lifted. If you have plans that will have a major impact on your life, such as marriage, divorce, or a job change, try to wait a bit. Talk it over with friends and loved ones who can help you look at the overall picture first. 
· Reaching out to people for help is important. Do not isolate yourself. Let your family and friends help you. Find someone you can trust and confide in, and talk to that person. · Be patient, and be kind to yourself. Remember that depression is not your fault and is not something you can overcome with willpower alone. Treatment is necessary for depression, just like for any other illness. Feeling better takes time, and your mood will improve little by little. Stay active · Stay busy and get outside. Take a walk, or try some other light exercise. · Talk with your doctor about an exercise program. Exercise can help with mild depression. · Go to a movie or concert. Take part in a Zoroastrianism activity or other social gathering. Go to a ball game. · Ask a friend to have dinner with you. Take care of yourself · Eat a balanced diet with plenty of fresh fruits and vegetables, whole grains, and lean protein. If you have lost your appetite, eat small snacks rather than large meals. · Avoid drinking alcohol or using illegal drugs. Do not take medicines that have not been prescribed for you. They may interfere with medicines you may be taking for depression, or they may make your depression worse. · Take your medicines exactly as they are prescribed. You may start to feel better within 1 to 3 weeks of taking antidepressant medicine. But it can take as many as 6 to 8 weeks to see more improvement.  If you have questions or concerns about your medicines, or if you do not notice any improvement by 3 weeks, talk to your doctor. · If you have any side effects from your medicine, tell your doctor. Antidepressants can make you feel tired, dizzy, or nervous. Some people have dry mouth, constipation, headaches, sexual problems, or diarrhea. Many of these side effects are mild and will go away on their own after you have been taking the medicine for a few weeks. Some may last longer. Talk to your doctor if side effects are bothering you too much. You might be able to try a different medicine. · Get enough sleep. If you have problems sleeping: ¨ Go to bed at the same time every night, and get up at the same time every morning. ¨ Keep your bedroom dark and quiet. ¨ Do not exercise after 5:00 p.m. ¨ Avoid drinks with caffeine after 5:00 p.m. · Avoid sleeping pills unless they are prescribed by the doctor treating your depression. Sleeping pills may make you groggy during the day, and they may interact with other medicine you are taking. · If you have any other illnesses, such as diabetes, heart disease, or high blood pressure, make sure to continue with your treatment. Tell your doctor about all of the medicines you take, including those with or without a prescription. · Keep the numbers for these national suicide hotlines: 3-802-505-TALK (1-497.518.9828) and 1-627-QRQLXWN (9-363.128.8526). If you or someone you know talks about suicide or feeling hopeless, get help right away. When should you call for help? Call 911 anytime you think you may need emergency care. For example, call if: 
  · You feel like hurting yourself or someone else.  
  · Someone you know has depression and is about to attempt or is attempting suicide.  
[facility] your doctor now or seek immediate medical care if: 
  · You hear voices.  
  · Someone you know has depression and: 
¨ Starts to give away his or her possessions. ¨ Uses illegal drugs or drinks alcohol heavily. ¨ Talks or writes about death, including writing suicide notes or talking about guns, knives, or pills. ¨ Starts to spend a lot of time alone. ¨ Acts very aggressively or suddenly appears calm.  
 Watch closely for changes in your health, and be sure to contact your doctor if: 
  · You do not get better as expected. Where can you learn more? Go to http://gabriella-juany.info/. Enter U449 in the search box to learn more about \"Recovering From Depression: Care Instructions. \" Current as of: December 7, 2017 Content Version: 11.8 © 8076-1131 [employer]. Care instructions adapted under license by [employer] (which disclaims liability or warranty for this information). If you have questions about a medical condition or this instruction, always ask your healthcare professional. [address] any warranty or liability for your use of this information. Introducing [facility]! Dear Ale Jorgensen: Thank you for requesting a [employer] account. Our records indicate that you already have an active [employer] account. You can access your account anytime at [email or web address]. [email or web address] Did you know that you can access your hospital and ER discharge instructions at any time in MerchMe? You can also review all of your test results from your hospital stay or ER visit. Additional Information If you have questions, please visit the Frequently Asked Questions section of the [employer] website at [email or web address]. [email or web address] Remember, MerchMe is NOT to be used for urgent needs. For medical emergencies, dial 911. Now available from your iPhone and Android! Please provide this summary of care documentation to your next provider. Your primary care clinician is listed as Juan Ochoa. If you have any questions after today's visit, please call [phone].

## 2018-11-14 ENCOUNTER — OFFICE VISIT (OUTPATIENT)
Dept: FAMILY MEDICINE CLINIC | Age: 38
End: 2018-11-14

## 2018-11-14 ENCOUNTER — HOSPITAL ENCOUNTER (OUTPATIENT)
Dept: LAB | Age: 38
Discharge: HOME | End: 2018-11-14
Payer: COMMERCIAL

## 2018-11-14 VITALS
TEMPERATURE: 98 F | WEIGHT: 247 LBS | RESPIRATION RATE: 18 BRPM | DIASTOLIC BLOOD PRESSURE: 82 MMHG | BODY MASS INDEX: 41.15 KG/M2 | SYSTOLIC BLOOD PRESSURE: 135 MMHG | HEIGHT: 65 IN | HEART RATE: 60 BPM | OXYGEN SATURATION: 94 %

## 2018-11-14 DIAGNOSIS — F43.0 ACUTE REACTION TO STRESS: Primary | ICD-10-CM

## 2018-11-14 DIAGNOSIS — N48.30 PROLONGED ERECTION: ICD-10-CM

## 2018-11-14 DIAGNOSIS — G89.29 CHRONIC PAIN OF RIGHT KNEE: ICD-10-CM

## 2018-11-14 DIAGNOSIS — M25.561 CHRONIC PAIN OF RIGHT KNEE: ICD-10-CM

## 2018-11-14 DIAGNOSIS — M25.511 ACUTE PAIN OF RIGHT SHOULDER: ICD-10-CM

## 2018-11-14 DIAGNOSIS — K21.9 GASTROESOPHAGEAL REFLUX DISEASE WITHOUT ESOPHAGITIS: ICD-10-CM

## 2018-11-14 PROCEDURE — 36415 COLL VENOUS BLD VENIPUNCTURE: CPT

## 2018-11-14 PROCEDURE — 84146 ASSAY OF PROLACTIN: CPT

## 2018-11-14 RX ORDER — PANTOPRAZOLE SODIUM 40 MG/1
40 TABLET, DELAYED RELEASE ORAL DAILY
Qty: 90 TAB | Refills: 1 | Status: SHIPPED | OUTPATIENT
Start: 2018-11-14 | End: 2019-05-03 | Stop reason: SDUPTHER

## 2018-11-14 NOTE — PROGRESS NOTES
Chief Complaint   Patient presents with    Knee Pain     right knee on and off x 3 weeks    Heartburn     1. Have you been to the ER, urgent care clinic since your last visit? Hospitalized since your last visit? No    2. Have you seen or consulted any other health care providers outside of the [facility] since your last visit? Include any pap smears or colon screening.  No

## 2018-11-14 NOTE — PROGRESS NOTES
HISTORY OF PRESENT ILLNESS  Vilma Caldwell is a 41 y.o. male. Patient presents for heart burn issues and knee pain. Chief Complaint   Patient presents with    Knee Pain     right knee on and off x 3 weeks    Heartburn       HPI  Cabergoline used for elevated prolactin levels. Pt read an article on this. His problem is not having an erection or maintaining one, it is that he can keep an erection for a long time. His knees hurt but his right knee hurts the worse. He finds going up and down stairs hard. He takes one step at a time and holds on to a railing. Going down he takes one step at a time and turns his feet towards the railing. He does not feel like it is going to give out. He has taken over the counter Motrin 2-3 tablets at a time. He takes Prilosec but what his grandmother gets from [place]. If he doesn't take it, his stomach hurts. He likes the Lexapro. He thinks it is working well. Review of Systems   Constitutional: Negative. Respiratory: Negative. Cardiovascular: Negative. Gastrointestinal: Positive for heartburn. Negative for abdominal pain, constipation and diarrhea. Genitourinary: Negative. Musculoskeletal: Positive for joint pain (knee pain, rightworse than left.). Visit Vitals  /82 (BP 1 Location: Left arm, BP Patient Position: Sitting)   Pulse 60   Temp 98 °F (36.7 °C) (Oral)   Resp 18   Ht 5' 5\" (1.651 m)   Wt 247 lb (112 kg)   SpO2 94%   BMI 41.10 kg/m²       Physical Exam   Constitutional: He appears well-developed. No distress. Cardiovascular: Normal rate, regular rhythm and normal heart sounds. No murmur heard. Pulmonary/Chest: Effort normal and breath sounds normal. No respiratory distress. He has no wheezes. He has no rales. Musculoskeletal:        Right knee: He exhibits effusion. He exhibits normal range of motion and no erythema. Tenderness found. Left knee: Normal.   Psychiatric: He has a normal mood and affect.  His behavior is normal. Thought content normal.       ASSESSMENT and PLAN    ICD-10-CM ICD-9-CM    1. Acute reaction to stress F43.0 308.9    2. Acute pain of right shoulder M25.511 719.41 MRI SHOULDER RT WO CONT   3. Gastroesophageal reflux disease without esophagitis K21.9 530.81    4. Chronic pain of right knee M25.561 719.46 MRI KNEE RT WO CONT    G89.29 338.29    5. Prolonged erection N48.30 607.3 PROLACTIN     PLAN:  We discussed the article he read and Pt would like to have his prolactin level tested. We discussed GERD and treatment options and how to take the medication. We discussed his right shoulder and right knee pain. Next step is MRI of both. We discussed physical therapy as a treatment option. Pt has an appt 12-5-18 here for follow up. I have discussed the diagnosis with the patient and the intended plan as seen in the above orders. The patient has received an after-visit summary and questions were answered concerning future plans. I have discussed medication side effects and warnings with the patient as well. Patient will call for further questions. Follow-up Disposition:  Return if symptoms worsen or fail to improve.

## 2018-11-15 LAB — PROLACTIN SERPL-MCNC: 9.6 NG/ML

## 2018-11-19 ENCOUNTER — HOSPITAL ENCOUNTER (OUTPATIENT)
Age: 38
Discharge: HOME | End: 2018-11-19
Attending: NURSE PRACTITIONER
Payer: COMMERCIAL

## 2018-11-19 DIAGNOSIS — M25.561 CHRONIC PAIN OF RIGHT KNEE: ICD-10-CM

## 2018-11-19 DIAGNOSIS — G89.29 CHRONIC PAIN OF RIGHT KNEE: ICD-10-CM

## 2018-11-19 DIAGNOSIS — M25.511 ACUTE PAIN OF RIGHT SHOULDER: ICD-10-CM

## 2018-11-19 PROCEDURE — 73221 MRI JOINT UPR EXTREM W/O DYE: CPT

## 2018-11-19 PROCEDURE — 73721 MRI JNT OF LWR EXTRE W/O DYE: CPT

## 2018-11-25 NOTE — PROGRESS NOTES
Please advise Pt that his MRI showed small supraspinator and infraspinator tear. There is also a anterior inferior labral santana with a possible superior labral tear.

## 2018-11-28 ENCOUNTER — TELEPHONE (OUTPATIENT)
Dept: FAMILY MEDICINE CLINIC | Age: 38
End: 2018-11-28

## 2018-11-28 NOTE — TELEPHONE ENCOUNTER
----- Message from Ambrosio Perez NP sent at 11/25/2018 12:09 PM EST -----  Please advise Pt that his MRI showed small supraspinator and infraspinator tear. There is also a anterior inferior labral santana with a possible superior labral tear.

## 2018-11-28 NOTE — TELEPHONE ENCOUNTER
----- Message from Arnold Sanchez NP sent at 11/25/2018 12:13 PM EST -----  Please advise Pt that there is a small area of cartilage loss on the lateral side of his knee.

## 2018-12-05 ENCOUNTER — OFFICE VISIT (OUTPATIENT)
Dept: FAMILY MEDICINE CLINIC | Age: 38
End: 2018-12-05

## 2018-12-05 VITALS
HEART RATE: 53 BPM | SYSTOLIC BLOOD PRESSURE: 132 MMHG | DIASTOLIC BLOOD PRESSURE: 78 MMHG | BODY MASS INDEX: 42.49 KG/M2 | TEMPERATURE: 98.3 F | WEIGHT: 255 LBS | OXYGEN SATURATION: 92 % | HEIGHT: 65 IN | RESPIRATION RATE: 17 BRPM

## 2018-12-05 DIAGNOSIS — G89.29 CHRONIC PAIN OF RIGHT KNEE: ICD-10-CM

## 2018-12-05 DIAGNOSIS — M25.511 ACUTE PAIN OF RIGHT SHOULDER: ICD-10-CM

## 2018-12-05 DIAGNOSIS — N48.33 PRIAPISM, DRUG-INDUCED: ICD-10-CM

## 2018-12-05 DIAGNOSIS — F43.0 ACUTE REACTION TO STRESS: Primary | ICD-10-CM

## 2018-12-05 DIAGNOSIS — M25.561 CHRONIC PAIN OF RIGHT KNEE: ICD-10-CM

## 2018-12-05 RX ORDER — BUPROPION HYDROCHLORIDE 150 MG/1
150 TABLET ORAL
Qty: 30 TAB | Refills: 5 | Status: SHIPPED | OUTPATIENT
Start: 2018-12-05 | End: 2019-01-09 | Stop reason: SDUPTHER

## 2018-12-05 NOTE — PROGRESS NOTES
HISTORY OF PRESENT ILLNESS  Nani Kennedy is a 41 y.o. male. Patient is her for follow up. HPI  Pt is still having problems with prolonged erection. Review of Systems   Constitutional: Negative. Respiratory: Negative. Cardiovascular: Negative. Gastrointestinal: Negative. Genitourinary: Negative. Visit Vitals  /78 (BP 1 Location: Left arm, BP Patient Position: Sitting)   Pulse (!) 53   Temp 98.3 °F (36.8 °C) (Oral)   Resp 17   Ht 5' 5\" (1.651 m)   Wt 255 lb (115.7 kg)   SpO2 92%   BMI 42.43 kg/m²     Physical Exam   Constitutional: He is oriented to person, place, and time. He appears well-developed. No distress. Cardiovascular: Normal rate, regular rhythm and normal heart sounds. No murmur heard. Pulmonary/Chest: Effort normal and breath sounds normal. No respiratory distress. He has no wheezes. He has no rales. Neurological: He is alert and oriented to person, place, and time. Psychiatric: He has a normal mood and affect. His behavior is normal. Judgment and thought content normal.       ASSESSMENT and PLAN    ICD-10-CM ICD-9-CM    1. Acute reaction to stress F43.0 308.9    2. Priapism, drug-induced N48.33 607.3      E980.5    3. Acute pain of right shoulder M25.511 719.41    4. Chronic pain of right knee M25.561 719.46     G89.29 338.29      PLAN:  Together we reviewed his labs. Since the Priapism started with the start of Lexapro, the plan is to wean off the lexapro. weaning off Lexpro:  Week one: alternate 20mg every other day with a half tablet (10mg)  Week two and three: take half tablet (10mg)  Week four and five : half tablet every other day  Then stop    Start: Wellbutrin 150mg    I have discussed the diagnosis with the patient and the intended plan as seen in the above orders. The patient has received an after-visit summary and questions were answered concerning future plans. I have discussed medication side effects and warnings with the patient as well.  Patient will call for further questions. Follow up in January. Pt is to call with any concerns.

## 2018-12-05 NOTE — PATIENT INSTRUCTIONS
weaning off Lexpro:  Week one: alternate 20mg every other day with a half tablet (10mg)  Week two and three: take half tablet (10mg)  Week four and five : half tablet every other day  Then stop    Start:  Wellbutrin 150mg

## 2018-12-05 NOTE — PROGRESS NOTES
Patient is in the office today for a follow up for acute reaction to stress. 1. Have you been to the ER, urgent care clinic since your last visit? Hospitalized since your last visit? No    2. Have you seen or consulted any other health care providers outside of the [address] [place] [address] since your last visit? Include any pap smears or colon screening.  No    Learning Assessment: complete  Depression Screening: complete  Fall Risk: n/a  Abuse Screening: complete

## 2018-12-10 ENCOUNTER — OFFICE VISIT (OUTPATIENT)
Dept: ORTHOPEDIC SURGERY | Age: 38
End: 2018-12-10

## 2018-12-10 VITALS
HEIGHT: 66 IN | BODY MASS INDEX: 40.98 KG/M2 | WEIGHT: 255 LBS | SYSTOLIC BLOOD PRESSURE: 131 MMHG | DIASTOLIC BLOOD PRESSURE: 68 MMHG | TEMPERATURE: 98 F | HEART RATE: 64 BPM | OXYGEN SATURATION: 94 %

## 2018-12-10 DIAGNOSIS — M17.11 PRIMARY OSTEOARTHRITIS OF RIGHT KNEE: ICD-10-CM

## 2018-12-10 DIAGNOSIS — M75.111 INCOMPLETE TEAR OF RIGHT ROTATOR CUFF: Primary | ICD-10-CM

## 2018-12-10 RX ORDER — MELOXICAM 15 MG/1
15 TABLET ORAL
Qty: 30 TAB | Refills: 1 | Status: SHIPPED | OUTPATIENT
Start: 2018-12-10 | End: 2019-01-09 | Stop reason: SDUPTHER

## 2018-12-10 RX ORDER — TRIAMCINOLONE ACETONIDE 40 MG/ML
40 INJECTION, SUSPENSION INTRA-ARTICULAR; INTRAMUSCULAR ONCE
Qty: 1 ML | Refills: 0
Start: 2018-12-10 | End: 2018-12-10

## 2018-12-10 NOTE — PROGRESS NOTES
Camila White  1980   Chief Complaint   Patient presents with    Knee Pain     RIGHT    Shoulder Pain     RIGHT        HISTORY OF PRESENT ILLNESS  Camila White is a 41 y.o. male who presents today for evaluation of right shoulder and chronic right knee pain. Pt is referred by Kaitlyn Arriaga NP. He rates his pain 0/10 today. Pain has been present for years, worse in the past year. 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 Patient describes the pain as aching that is Intermittent in nature. Symptoms are worse with walking and standing, Activity and is better with  Rest. Associated symptoms include Swelling. Since problem started, it: has worsened. Pain does not wake patient up at night. Has taken no meds for the problem. The patient has an additional complain of shoulder pain after lifting a heavy object overhead in August 2018. Reaching across or behind exacerbate his shoulder pain. He notes pain while lying on his left arm and pain does wake him up at night. Has tried following treatments: Injections:NO; Brace:NO; Therapy:NO; Cane/Crutch:NO       No Known Allergies     History reviewed. No pertinent past medical history.    Social History     Socioeconomic History    Marital status:      Spouse name: Not on file    Number of children: Not on file    Years of education: Not on file    Highest education level: Not on file   Social Needs    Financial resource strain: Not on file    Food insecurity - worry: Not on file    Food insecurity - inability: Not on file    Transportation needs - medical: Not on file   [employer] needs - non-medical: Not on file   Occupational History    Not on file   Tobacco Use    Smoking status: Never Smoker    Smokeless tobacco: Current User   Substance and Sexual Activity    Alcohol use: No    Drug use: No    Sexual activity: Not on file   Other Topics Concern    Not on file   Social History Narrative    Not on file      History reviewed. No pertinent surgical history. History reviewed. No pertinent family history. Current Outpatient Medications   Medication Sig    buPROPion XL (WELLBUTRIN XL) 150 mg tablet Take 1 Tab by mouth every morning.  pantoprazole (PROTONIX) 40 mg tablet Take 1 Tab by mouth daily.  clobetasol (TEMOVATE) 0.05 % external solution Use bid to affect area (scalp) for two weeks then decrease to twice a week for maintenance    tadalafil (CIALIS) 20 mg tablet Take every 3 days    loratadine (CLARITIN) 10 mg tablet Take 10 mg by mouth. No current facility-administered medications for this visit. REVIEW OF SYSTEM   Patient denies: Weight loss, Fever/Chills, HA, Visual changes, Fatigue, Chest pain, SOB, Abdominal pain, N/V/D/C, Blood in stool or urine, Edema. Pertinent positive as above in HPI. All others were negative    PHYSICAL EXAM:   Visit Vitals  /68   Pulse 64   Temp 98 °F (36.7 °C)   Ht 5' 6\" (1.676 m)   Wt 255 lb (115.7 kg)   SpO2 94%   BMI 41.16 kg/m²     The patient is a well-developed, well-nourished male   in no acute distress. The patient is alert and oriented times three. The patient is alert and oriented times three. Mood and affect are normal.  LYMPHATIC: lymph nodes are not enlarged and are within normal limits  SKIN: normal in color and non tender to palpation. There are no bruises or abrasions noted. NEUROLOGICAL: Motor sensory exam is within normal limits. Reflexes are equal bilaterally.  There is normal sensation to pinprick and light touch  MUSCULOSKELETAL:  Examination Right shoulder   Skin Intact   AC joint tenderness -   Biceps tenderness -   Forward flexion/Elevation    Active abduction    Glenohumeral abduction 90   External rotation ROM 60   Internal rotation ROM 30   Apprehension -   Irmas Relocation -   Jerk -   Load and Shift -   Obriens -   Speeds -   Impingement sign +   Supraspinatus/Empty Can + External Rotation Strength -, 5/5   Lift Off/Belly Press -, 5/5   Neurovascular Intact     Examination Right knee   Skin Intact   Range of motion 0-130   Effusion +   Medial joint line tenderness -   Lateral joint line tenderness -   Tenderness Pes Bursa -   Tenderness insertion MCL -   Tenderness insertion LCL -   Qings -   Patella crepitus +   Patella grind -   Lachman -   Pivot shift -   Anterior drawer -   Posterior drawer -   Varus stress -   Valgus stress -   Neurovascular Intact   Calf Swelling and Tenderness to Palpation -   Sadiq's Test -   Hamstring Cord Tightness -     PROCEDURE: Right Shoulder Injection with Ultrasound Guidance  Indication:Right Shoulder pain/swelling    After sterile prep, 6 cc of Xylocaine and 1 cc of Kenalog were injected into the right shoulder. Ultrasound images captured using [employer] Ultrasound machine and scanned into patient's chart.        [facility] - [facility]  OFFICE PROCEDURE PROGRESS NOTE        Chart reviewed for the following:  Tabitha Grant M.D, have reviewed the History, Physical and updated the Allergic reactions for [address] performed immediately prior to start of procedure:  Tabitha Grant M.D, have performed the following reviews on Hunter Belling prior to the start of the procedure:            * Patient was identified by name and date of birth   * Agreement on procedure being performed was verified  * Risks and Benefits explained to the patient  * Procedure site verified and marked as necessary  * Patient was positioned for comfort  * Consent was signed and verified     Time: 3:32 PM     Date of procedure: 12/10/2018    Procedure performed by:  Sky Hickman M.D    Provider assisted by: (see medication administration)    How tolerated by patient: tolerated the procedure well with no complications    Comments: none      IMAGING: MRI RT SHOULDER dated 11/19/18 reviewed:  IMPRESSION:  1. Small supraspinatus/infraspinatus tear at the footplate superimposed on a background of mild tendinosis. 2. Anterior inferior labral tear. 3. Suspicious for superior labral tear. MRI RT KNEE dated 11/19/18 reviewed:  IMPRESSION:  1. Small focus of deep cartilage loss on the lateral trochlea. Otherwise, unremarkable study. IMPRESSION:      ICD-10-CM ICD-9-CM    1. Incomplete tear of right rotator cuff M75.111 840.4 meloxicam (MOBIC) 15 mg tablet      TRIAMCINOLONE ACETONIDE INJ      triamcinolone acetonide (KENALOG) 40 mg/mL injection      US GUIDE INJ/ASP/ARTHRO LG JNT/BURSA   2. Primary osteoarthritis of right knee M17.11 715.16 meloxicam (MOBIC) 15 mg tablet         PLAN:  1. The patient presents today with right shoulder and knee pain due to a partial rotator cuff tear and osteoarthritis, and I would like to try an injection in the shoulder today. Given hamstring stretches today. Risk factors include: n/a  2. No ultrasound exam indicated today  3. Yes cortisone injection indicated today R SHOULDER US  4. No Physical/Occupational Therapy indicated today  5. No diagnostic test indicated today:   6. No durable medical equipment indicated today  7. No referral indicated today   8. Yes medications indicated today: MOBIC  9. No Narcotic indicated today      RTC 3 weeks if pain continues  Follow-up Disposition: Not on File    Scribed by Louie Gambino [facility]) as dictated by Sky Hickman MD    I, Dr. Sky Hickman, confirm that all documentation is accurate.     Sky Hickman M.D.   [facility]

## 2018-12-19 ENCOUNTER — PATIENT MESSAGE (OUTPATIENT)
Dept: FAMILY MEDICINE CLINIC | Age: 38
End: 2018-12-19

## 2018-12-21 ENCOUNTER — OFFICE VISIT (OUTPATIENT)
Dept: FAMILY MEDICINE CLINIC | Age: 38
End: 2018-12-21

## 2018-12-21 VITALS
BODY MASS INDEX: 39.37 KG/M2 | WEIGHT: 245 LBS | HEART RATE: 60 BPM | OXYGEN SATURATION: 98 % | RESPIRATION RATE: 16 BRPM | SYSTOLIC BLOOD PRESSURE: 141 MMHG | TEMPERATURE: 98 F | HEIGHT: 66 IN | DIASTOLIC BLOOD PRESSURE: 84 MMHG

## 2018-12-21 DIAGNOSIS — M54.41 MIDLINE LOW BACK PAIN WITH BILATERAL SCIATICA, UNSPECIFIED CHRONICITY: Primary | ICD-10-CM

## 2018-12-21 DIAGNOSIS — M54.42 MIDLINE LOW BACK PAIN WITH BILATERAL SCIATICA, UNSPECIFIED CHRONICITY: Primary | ICD-10-CM

## 2018-12-21 RX ORDER — LANOLIN ALCOHOL/MO/W.PET/CERES
500 CREAM (GRAM) TOPICAL DAILY
COMMUNITY
End: 2019-07-31

## 2018-12-21 RX ORDER — GLUCOSAMINE/CHONDR SU A SOD 750-600 MG
TABLET ORAL
COMMUNITY
End: 2019-07-31

## 2018-12-21 RX ORDER — ORPHENADRINE CITRATE 100 MG/1
100 TABLET, EXTENDED RELEASE ORAL 2 TIMES DAILY
Qty: 30 TAB | Refills: 0 | Status: SHIPPED | OUTPATIENT
Start: 2018-12-21 | End: 2019-01-09 | Stop reason: SDUPTHER

## 2018-12-21 NOTE — PROGRESS NOTES
Chief Complaint   Patient presents with    Back Pain     1. Have you been to the ER, urgent care clinic since your last visit? Hospitalized since your last visit? No    2. Have you seen or consulted any other health care providers outside of the [address] [place] [address] since your last visit? Include any pap smears or colon screening.  No

## 2018-12-21 NOTE — LETTER
NOTIFICATION RETURN TO WORK / SCHOOL 
 
12/21/2018 3:44 PM 
 
Mr. Carri Ventura [address] [address] To Whom It May Concern: 
 
Carri Ventura is currently under the care of [address] . He was seen today for sick visit. Please excuse from work 12-23-18 and 12-24-18. If there are questions or concerns please have the patient contact our office. Sincerely, Soham Valdez NP

## 2018-12-21 NOTE — PROGRESS NOTES
HISTORY OF PRESENT ILLNESS  Liliane Sherwood is a 41 y.o. male. Patient presents for back pain. HPI  Pt thinks he thrown his back out. This was on Tuesday. He was at the [place]. He is not sure what triggered this. He has been using heat. It hurts to get up and down. Review of Systems   Constitutional: Negative. Respiratory: Negative. Cardiovascular: Negative. Genitourinary: Negative. Musculoskeletal: Positive for back pain. Visit Vitals  /84 (BP 1 Location: Left arm, BP Patient Position: Sitting)   Pulse 60   Temp 98 °F (36.7 °C) (Oral)   Resp 16   Ht 5' 6\" (1.676 m)   Wt 245 lb (111.1 kg)   SpO2 98%   BMI 39.54 kg/m²       Physical Exam   Constitutional: He appears well-developed. No distress. Cardiovascular: Normal rate, regular rhythm and normal heart sounds. No murmur heard. Pulmonary/Chest: Effort normal and breath sounds normal. No respiratory distress. He has no wheezes. He has no rales. Abdominal: There is no CVA tenderness. Musculoskeletal:        Lumbar back: He exhibits decreased range of motion, tenderness and spasm. ASSESSMENT and PLAN    ICD-10-CM ICD-9-CM    1. Midline low back pain with bilateral sciatica, unspecified chronicity M54.41 724.3 orphenadrine citrate (NORFLEX) 100 mg sr tablet    M54.42 724.2      PLAN:  Pt is on Mobic as an anti-inflammatory. Pt given Norflex to take twice a day. Recommended that he continue heat therapy. Pt is to call with any concerns. I have discussed the diagnosis with the patient and the intended plan as seen in the above orders. The patient has received an after-visit summary and questions were answered concerning future plans. I have discussed medication side effects and warnings with the patient as well. Patient will call for further questions. Follow-up Disposition:  Return if symptoms worsen or fail to improve.

## 2019-01-07 ENCOUNTER — OFFICE VISIT (OUTPATIENT)
Dept: ORTHOPEDIC SURGERY | Age: 39
End: 2019-01-07

## 2019-01-07 VITALS
RESPIRATION RATE: 16 BRPM | HEIGHT: 66 IN | DIASTOLIC BLOOD PRESSURE: 85 MMHG | WEIGHT: 251 LBS | TEMPERATURE: 96 F | SYSTOLIC BLOOD PRESSURE: 128 MMHG | OXYGEN SATURATION: 97 % | HEART RATE: 60 BPM | BODY MASS INDEX: 40.34 KG/M2

## 2019-01-07 DIAGNOSIS — M75.111 INCOMPLETE TEAR OF RIGHT ROTATOR CUFF: Primary | ICD-10-CM

## 2019-01-07 NOTE — PROGRESS NOTES
1. Have you been to the ER, urgent care clinic since your last visit? Hospitalized since your last visit? Yes When: [facility] [place] 2. Have you seen or consulted any other health care providers outside of the [address] [place] [address] since your last visit? Include any pap smears or colon screening.  No

## 2019-01-07 NOTE — PROGRESS NOTES
Inell Barthel 1980 Chief Complaint Patient presents with  Shoulder Pain RIGHT SHOULDER PAIN  
 Knee Pain RIGHT KNEE PAIN  
  
 
HISTORY OF PRESENT ILLNESS Inell Barthel is a 41 y.o. male who presents today for reevaluation of right shoulder and knee pain. Patient rates pain as 0/10 today. At last OV, patient had a cortisone injection in the shoulder which provided good relief. He can now do most activities with his arm. Pain has been present for years, worse in the past year. 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 He is back to work with no restrictions. Patient denies any fever, chills, chest pain, shortness of breath or calf pain. The remainder of the review of systems is negative. There are no new illness or injuries to report since last seen in the office. There are no changes to medications, allergies, family or social history. PHYSICAL EXAM:  
Visit Vitals /85 Pulse 60 Temp 96 °F (35.6 °C) (Oral) Resp 16 Ht 5' 6\" (1.676 m) Wt 251 lb (113.9 kg) SpO2 97% BMI 40.51 kg/m² The patient is a well-developed, well-nourished male   in no acute distress. The patient is alert and oriented times three. The patient is alert and oriented times three. Mood and affect are normal. 
LYMPHATIC: lymph nodes are not enlarged and are within normal limits SKIN: normal in color and non tender to palpation. There are no bruises or abrasions noted. NEUROLOGICAL: Motor sensory exam is within normal limits. Reflexes are equal bilaterally. There is normal sensation to pinprick and light touch MUSCULOSKELETAL: 
Examination Right shoulder Skin Intact AC joint tenderness - Biceps tenderness - Forward flexion/Elevation  Active abduction  Glenohumeral abduction 90 External rotation ROM 60 Internal rotation ROM 30 Apprehension -  
Irmas Relocation -  
Jerk - Load and Shift -  
Leopold Garcia -  
 Speeds - Impingement sign - Supraspinatus/Empty Can - External Rotation Strength -, 5/5 Lift Off/Belly Press -, 5/5 Neurovascular Intact  
  
Examination Right knee Skin Intact Range of motion 0-130 Effusion + Medial joint line tenderness - Lateral joint line tenderness - Tenderness Pes Bursa - Tenderness insertion MCL - Tenderness insertion LCL - Qings -  
Patella crepitus + Patella grind - Lachman - Pivot shift - Anterior drawer - Posterior drawer -  
Varus stress -  
Valgus stress - Neurovascular Intact Calf Swelling and Tenderness to Palpation -  
Sadiq's Test -  
Hamstring Cord Tightness - IMAGING: MRI RT SHOULDER dated 11/19/18 reviewed: 
IMPRESSION: 
1. Small supraspinatus/infraspinatus tear at the footplate superimposed on a background of mild tendinosis. 2. Anterior inferior labral tear. 3. Suspicious for superior labral tear. 
  
MRI RT KNEE dated 11/19/18 reviewed: 
IMPRESSION: 
1. Small focus of deep cartilage loss on the lateral trochlea. Otherwise, unremarkable study. 
  
 
IMPRESSION:   
  ICD-10-CM ICD-9-CM 1. Incomplete tear of right rotator cuff M75.111 840.4 PLAN:  
1. The patient presents today with right shoulder pain due to partial rotator cuff tear which was helped greatly by a cortisone injection. Risk factors include: n/a 2. No ultrasound exam indicated today 3. No cortisone injection indicated today 4. No Physical/Occupational Therapy indicated today 5. No diagnostic test indicated today: 6. No durable medical equipment indicated today 7. No referral indicated today 8. No medications indicated today: 9. No Narcotic indicated today RTC prn Follow-up Disposition: Not on File Scribed by Claudia Rod ([facility]) as dictated by MD MEHUL Lovett, Dr. Elaina Gutierrez, confirm that all documentation is accurate.  
 
SAMUEL Lovett [facility]

## 2019-01-09 ENCOUNTER — OFFICE VISIT (OUTPATIENT)
Dept: FAMILY MEDICINE CLINIC | Age: 39
End: 2019-01-09

## 2019-01-09 VITALS
HEART RATE: 73 BPM | HEIGHT: 66 IN | TEMPERATURE: 98.4 F | SYSTOLIC BLOOD PRESSURE: 123 MMHG | WEIGHT: 248 LBS | DIASTOLIC BLOOD PRESSURE: 84 MMHG | OXYGEN SATURATION: 98 % | BODY MASS INDEX: 39.86 KG/M2 | RESPIRATION RATE: 17 BRPM

## 2019-01-09 DIAGNOSIS — M79.644 CHRONIC THUMB PAIN, BILATERAL: ICD-10-CM

## 2019-01-09 DIAGNOSIS — M54.42 MIDLINE LOW BACK PAIN WITH BILATERAL SCIATICA, UNSPECIFIED CHRONICITY: Primary | ICD-10-CM

## 2019-01-09 DIAGNOSIS — G89.29 CHRONIC THUMB PAIN, BILATERAL: ICD-10-CM

## 2019-01-09 DIAGNOSIS — M17.11 PRIMARY OSTEOARTHRITIS OF RIGHT KNEE: ICD-10-CM

## 2019-01-09 DIAGNOSIS — M79.645 CHRONIC THUMB PAIN, BILATERAL: ICD-10-CM

## 2019-01-09 DIAGNOSIS — M54.41 MIDLINE LOW BACK PAIN WITH BILATERAL SCIATICA, UNSPECIFIED CHRONICITY: Primary | ICD-10-CM

## 2019-01-09 DIAGNOSIS — F43.0 ACUTE REACTION TO STRESS: ICD-10-CM

## 2019-01-09 DIAGNOSIS — M75.111 INCOMPLETE TEAR OF RIGHT ROTATOR CUFF: ICD-10-CM

## 2019-01-09 RX ORDER — ORPHENADRINE CITRATE 100 MG/1
100 TABLET, EXTENDED RELEASE ORAL 2 TIMES DAILY
Qty: 30 TAB | Refills: 1 | Status: SHIPPED | OUTPATIENT
Start: 2019-01-09 | End: 2019-07-31

## 2019-01-09 RX ORDER — MELOXICAM 15 MG/1
15 TABLET ORAL
Qty: 90 TAB | Refills: 1 | Status: SHIPPED | OUTPATIENT
Start: 2019-01-09 | End: 2022-09-06 | Stop reason: ALTCHOICE

## 2019-01-09 RX ORDER — BUPROPION HYDROCHLORIDE 150 MG/1
150 TABLET ORAL
Qty: 90 TAB | Refills: 1 | Status: SHIPPED | OUTPATIENT
Start: 2019-01-09

## 2019-01-09 NOTE — PROGRESS NOTES
HISTORY OF PRESENT ILLNESS  Semaj Gonzales is a 41 y.o. male. Patient presents for chronic care    HPI  rosaura thumbs: pain Pt has had this for years and when it is really acting up he uses his wrist splints. This started when he was in the [employer]. They hurt worse with use, to the point it can be hard to  things. Back pain: is better but bothers him when he gets up from sitting. Does not recall any injury. Wellbutrin 150mg is working. He would like a 90 day supply. Review of Systems   Constitutional: Negative. HENT: Negative. Cardiovascular: Negative. Musculoskeletal: Positive for back pain and joint pain (rosaura thumb pain, left shoulder pain improved. ). Psychiatric/Behavioral: Positive for depression (stable). Visit Vitals  /84 (BP 1 Location: Left arm, BP Patient Position: Sitting)   Pulse 73   Temp 98.4 °F (36.9 °C) (Oral)   Resp 17   Ht 5' 6\" (1.676 m)   Wt 248 lb (112.5 kg)   SpO2 98%   BMI 40.03 kg/m²     Physical Exam   Constitutional: He appears well-developed. No distress. Cardiovascular: Normal rate, regular rhythm and normal heart sounds. No murmur heard. Pulmonary/Chest: Effort normal and breath sounds normal. No respiratory distress. He has no wheezes. He has no rales. Musculoskeletal:        Right wrist: He exhibits tenderness. He exhibits normal range of motion. Left wrist: He exhibits tenderness. He exhibits normal range of motion. Right hand: He exhibits tenderness (base of the thumb). He exhibits normal range of motion, no deformity and no swelling. Normal sensation noted. Normal strength noted. Left hand: He exhibits tenderness (base of the thumb). He exhibits normal range of motion, normal capillary refill and no swelling. Normal sensation noted. Normal strength noted. Psychiatric: He has a normal mood and affect. His behavior is normal. Judgment and thought content normal.     ASSESSMENT and PLAN    ICD-10-CM ICD-9-CM    1.  Midline low back pain with bilateral sciatica, unspecified chronicity M54.41 724.3 MRI LUMB SPINE WO CONT    M54.42 724.2    2. Acute reaction to stress F43.0 308.9 buPROPion XL (WELLBUTRIN XL) 150 mg tablet   3. Incomplete tear of right rotator cuff M75.111 840.4 meloxicam (MOBIC) 15 mg tablet   4. Primary osteoarthritis of right knee M17.11 715.16 meloxicam (MOBIC) 15 mg tablet   5. Chronic thumb pain, bilateral M79.646 729.5 REFERRAL TO ORTHOPEDICS    G89.29 338.29      PLAN:  Thumbs: Wrist splints, anti-inflammatory    Back: muscle relaxer and anti-inflammatory    Pt will continue current dose of Wellbutrin. Pt ref to ortho for hands. Dif Dx: Karli Driver    Pt given script for MRI at his request to be done at the [place]    I have discussed the diagnosis with the patient and the intended plan as seen in the above orders. The patient has received an after-visit summary and questions were answered concerning future plans. I have discussed medication side effects and warnings with the patient as well. Patient will call for further questions. Follow-up Disposition:  Return in about 6 months (around 7/9/2019) for chronic care.

## 2019-01-09 NOTE — PROGRESS NOTES
Patient is in the office today for a follow up for chronic care    1. Have you been to the ER, urgent care clinic since your last visit? Hospitalized since your last visit? No    2. Have you seen or consulted any other health care providers outside of the [address] [place] [address] since your last visit? Include any pap smears or colon screening.  No

## 2019-01-17 ENCOUNTER — TELEPHONE (OUTPATIENT)
Dept: FAMILY MEDICINE CLINIC | Age: 39
End: 2019-01-17

## 2019-01-17 NOTE — TELEPHONE ENCOUNTER
Patient returned call; patient describes his arm as feeling weak and heavy. Patient denies any numbness or tingling, and reports that he does have a rotator cuff in the right shoulder that he was seeing Ortho for. Per patient, he saw ortho earlier this month and had a shot given in the shoulder. Advised patient to call ortho and follow up with them for an evaluation on this concern. Patient verbalized understanding.

## 2019-01-17 NOTE — TELEPHONE ENCOUNTER
Regarding: Non-Urgent Medical Question  Contact: [phone]  ----- Message from [address] [place] [address] 951, Generic sent at 1/17/2019  9:01 AM EST -----    I am experiencing weakness in my right arm that came on pretty sudden this morning.  I can barely hold a pencil while writing

## 2019-01-22 ENCOUNTER — OFFICE VISIT (OUTPATIENT)
Dept: ORTHOPEDIC SURGERY | Facility: CLINIC | Age: 39
End: 2019-01-22

## 2019-01-22 VITALS
HEART RATE: 76 BPM | TEMPERATURE: 96.2 F | OXYGEN SATURATION: 98 % | DIASTOLIC BLOOD PRESSURE: 85 MMHG | BODY MASS INDEX: 39.37 KG/M2 | WEIGHT: 245 LBS | RESPIRATION RATE: 16 BRPM | HEIGHT: 66 IN | SYSTOLIC BLOOD PRESSURE: 135 MMHG

## 2019-01-22 DIAGNOSIS — M18.0 ARTHRITIS OF CARPOMETACARPAL (CMC) JOINT OF BOTH THUMBS: ICD-10-CM

## 2019-01-22 DIAGNOSIS — M79.641 BILATERAL HAND PAIN: Primary | ICD-10-CM

## 2019-01-22 DIAGNOSIS — M79.642 BILATERAL HAND PAIN: Primary | ICD-10-CM

## 2019-01-22 NOTE — PROGRESS NOTES
Aubree Stephenson is a 41 y.o. 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 Worker's Compensation and legal considerations: none filed. Vitals:  
 01/22/19 1521 BP: 135/85 Pulse: 76 Resp: 16 Temp: 96.2 °F (35.7 °C) TempSrc: Oral  
SpO2: 98% Weight: 245 lb (111.1 kg) Height: 5' 6\" (1.676 m) PainSc:   0 - No pain PainLoc: Finger Chief Complaint Patient presents with  Thumb Pain B/L thumb pain HPI: Patient comes in today with complaints of bilateral thumb weakness. He reports to have some pain however minimal.  He localizes pain to the base of the thumb. He denies any injuries. He reports his biggest complaint to be after he is used his hand a lot not been able to get a good  on things. He also reports cracking his joint at the base of his thumb every day. Date of onset: For a few years worsening recently Injury: No 
 
Prior Treatment:  No 
 
Numbness/ Tingling: No 
 
ROS: Review of Systems - General ROS: negative Respiratory ROS: no cough, shortness of breath, or wheezing Cardiovascular ROS: no chest pain or dyspnea on exertion Musculoskeletal ROS: positive for - pain in hand - bilateral 
Neurological ROS: negative Dermatological ROS: negative History reviewed. No pertinent past medical history. History reviewed. No pertinent surgical history. Current Outpatient Medications Medication Sig Dispense Refill  triamcinolone acetonide (KENALOG) 10 mg/mL injection 1 mL by IntraBURSal route once for 1 dose. 1 Vial 0  
 buPROPion XL (WELLBUTRIN XL) 150 mg tablet Take 1 Tab by mouth every morning. 90 Tab 1  
 meloxicam (MOBIC) 15 mg tablet Take 1 Tab by mouth daily (with breakfast). 90 Tab 1  
 orphenadrine citrate (NORFLEX) 100 mg sr tablet Take 1 Tab by mouth two (2) times a day. 30 Tab 1  cyanocobalamin (VITAMIN B12) 500 mcg tablet Take 500 mcg by mouth daily.     
 glucosamine/chondr starr A sod (GLUCOSAMINE-CHONDROITIN) 750-600 mg tab Take  by mouth.  pantoprazole (PROTONIX) 40 mg tablet Take 1 Tab by mouth daily. 90 Tab 1  
 tadalafil (CIALIS) 20 mg tablet Take every 3 days 9 Tab 5  loratadine (CLARITIN) 10 mg tablet Take 10 mg by mouth.  clobetasol (TEMOVATE) 0.05 % external solution Use bid to affect area (scalp) for two weeks then decrease to twice a week for maintenance 50 mL 2 No Known Allergies PE:  
 
Hand: There is no tenderness over the first [facility] joint or over the STT joint laterally. The patient does actively pop these joints however during exam. 
 
Examination L Digit(s) R Digit(s) 1st CMC Tenderness -  -   
1st CMC Grind -  - Priscilla Nodes -  -   
Heberden Nodes -  -   
A1 Pulley Tenderness -  - Triggering -  -   
UCL Instability -  -   
RCL Instability -  - Lateral Stress Pain -  -   
Palmar Cords -  - Tabletop test -  -   
Garrod's Pads -  -   
 Strength Pinch Strength      
 
ROM: Full Imaging: plain films of bilateral hands and cmc joints show only mild degenerative changes ICD-10-CM ICD-9-CM 1. Bilateral hand pain M79.641 729.5 AMB POC XRAY, HAND; 3+ VIEWS  
 M79.642  AMB POC XRAY, HAND; 3+ VIEWS  
   AMB SUPPLY ORDER  
   triamcinolone acetonide (KENALOG) 10 mg/mL injection DRAIN/INJECT SMALL JOINT/BURSA TRIAMCINOLONE ACETONIDE INJ 2. Arthritis of carpometacarpal (CMC) joint of both thumbs M18.11 716.94 AMB POC XRAY, HAND; 3+ VIEWS  
 M18.12  AMB POC XRAY, HAND; 3+ VIEWS  
   AMB SUPPLY ORDER  
   triamcinolone acetonide (KENALOG) 10 mg/mL injection DRAIN/INJECT SMALL JOINT/BURSA TRIAMCINOLONE ACETONIDE INJ Plan:  
 
Given the patient's history of symptoms, we will treat this as a CMC arthropathy. He does not have any the provocative test on exam. 
 
Bilateral cool comfort braces in bilateral thumb CMC injections Follow-up PRN no sooner than 2 months I have also instructed the patient to stop cracking his thumb joint knuckles. [facility] PROCEDURE PROGRESS NOTE Chart reviewed for the following: 
 Narendra CARVER DO, have reviewed the History, Physical and updated the Allergic reactions for Dale Roth TIME OUT performed immediately prior to start of procedure: 
 Narendra CARVER DO, have performed the following reviews on Dale Roth prior to the start of the procedure: 
         
* Patient was identified by name and date of birth * Agreement on procedure being performed was verified * Risks and Benefits explained to the patient * Procedure site verified and marked as necessary * Patient was positioned for comfort * Consent was signed and verified Time: 15:40 Date of procedure: 1/22/2019 Procedure performed by: Cherelle Sommer DO 
 
Provider assisted by: Maykel Ortiz LPN Patient assisted by: self How tolerated by patient: tolerated the procedure well with no complications Post Procedural Pain Scale: 0 - No Hurt Comments: none Procedure: After consent was obtained, using sterile technique the joint was prepped. Local anesthetic used: 1% lidocaine. Kenalog 5 mg X2 and was then injected and the needle withdrawn. The procedure was well tolerated. The patient is asked to continue to rest the area for a few more days before resuming regular activities. It may be more painful for the first 1-2 days. Watch for fever, or increased swelling or persistent pain in the joint. Call or return to clinic prn if such symptoms occur or there is failure to improve as anticipated. Plan was reviewed with patient, who verbalized agreement and understanding of the plan

## 2019-01-22 NOTE — PATIENT INSTRUCTIONS
Learning About Arthritis at the [facility] of the Thumb What is it? Arthritis at the base of the thumb joint is wear and tear on the cartilage. Cartilage is a firm, thick, slippery tissue. It covers and protects the ends of bones where they meet to form a joint. When you have arthritis, there are changes in the cartilage that cause it to break down. The bones rub together and cause joint damage and pain. What causes it? Experts don't know what causes arthritis at the base of the thumb. But aging, a lot of use, an injury, or family history may play a part. What are the symptoms? Symptoms of arthritis at the base of the thumb include aching in your joint. Or the pain may feel burning or sharp. You may feel clicking, creaking, or catching in the joint. It may get stiff. You may have more pain and less strength when you pinch or  things. Symptoms may come and go, stay the same, or get worse over time. How is it diagnosed? Your doctor can often diagnose arthritis by asking you questions about your joint pain and other symptoms and examining you. You may also have X-rays and blood tests. Blood tests can help make sure another disease isn't causing your symptoms. How is it treated? Arthritis at the base of your thumb may be treated with rest, pain relievers, steroid medicines, using a brace or splint, andin some casessurgery. To help relieve pain in the joint, rest your sore hand. Switch hands for some activities. You can try heat and cold therapy, such as hot compresses, paraffin wax, cold packs, or ice massage. Your doctor may give you a splint to wear during some activities or when pain flares up. You can often manage mild or moderate arthritis pain with over-the-counter pain relievers. These include medicines that reduce swelling, such as ibuprofen or naproxen. You can also use acetaminophen. Sometimes these medicines are in creams that you can rub on your thumb and hand.  Your doctor may also prescribe other medicine for your pain. For some people, steroid shots may be an option. If none of the treatments work, your doctor may discuss surgery with you. Follow-up care is a key part of your treatment and safety. Be sure to make and go to all appointments, and call your doctor if you are having problems. It's also a good idea to know your test results and keep a list of the medicines you take. Where can you learn more? Go to http://gabriella-juany.info/. Enter T110 in the search box to learn more about \"Learning About Arthritis at the [facility] of the Thumb. \" Current as of: Megan 10, 2018 Content Version: 11.9 © 6327-2411 [employer]. Care instructions adapted under license by [employer] (which disclaims liability or warranty for this information). If you have questions about a medical condition or this instruction, always ask your healthcare professional. [address] any warranty or liability for your use of this information.

## 2019-03-22 ENCOUNTER — OFFICE VISIT (OUTPATIENT)
Dept: FAMILY MEDICINE CLINIC | Age: 39
End: 2019-03-22

## 2019-03-22 ENCOUNTER — HOSPITAL ENCOUNTER (OUTPATIENT)
Dept: LAB | Age: 39
Discharge: HOME | End: 2019-03-22
Payer: COMMERCIAL

## 2019-03-22 VITALS
SYSTOLIC BLOOD PRESSURE: 131 MMHG | RESPIRATION RATE: 17 BRPM | TEMPERATURE: 98 F | OXYGEN SATURATION: 98 % | HEART RATE: 58 BPM | BODY MASS INDEX: 37.45 KG/M2 | WEIGHT: 233 LBS | DIASTOLIC BLOOD PRESSURE: 76 MMHG | HEIGHT: 66 IN

## 2019-03-22 DIAGNOSIS — M25.50 ARTHRALGIA, UNSPECIFIED JOINT: Primary | ICD-10-CM

## 2019-03-22 DIAGNOSIS — E66.01 SEVERE OBESITY WITH BODY MASS INDEX (BMI) OF 35.0 TO 39.9 WITH SERIOUS COMORBIDITY (HCC): ICD-10-CM

## 2019-03-22 DIAGNOSIS — M25.50 ARTHRALGIA, UNSPECIFIED JOINT: ICD-10-CM

## 2019-03-22 PROCEDURE — 36415 COLL VENOUS BLD VENIPUNCTURE: CPT

## 2019-03-22 PROCEDURE — 86225 DNA ANTIBODY NATIVE: CPT

## 2019-03-22 NOTE — PROGRESS NOTES
Chief Complaint Patient presents with  Weight Management  
  diet 1. Have you been to the ER, urgent care clinic since your last visit? Hospitalized since your last visit? NO 
 
2. Have you seen or consulted any other health care providers outside of the [address] [place] [address] since your last visit? Include any pap smears or colon screening.  No

## 2019-03-22 NOTE — PROGRESS NOTES
HISTORY OF PRESENT ILLNESS Dale Roth is a 41 y.o. male. Patient presents concerning weight. HPI Pt states he is working on losing weight and wants to know if the keto diet is ok or is it doing harm. He has lost 20lbs so far he says, his wife is also doing the diet with him. He thinks his goal weight is 150lbs Pt states for a very long time, at night he has to rotate his wrist and ankles to \"losen\" them up. This irritates his wife. They just feels \"tight\". Review of Systems Constitutional: Positive for weight loss (intentionally). Negative for chills and fever. HENT: Negative. Respiratory: Negative. Cardiovascular: Negative. Gastrointestinal: Negative. Genitourinary: Negative. Visit Vitals /76 (BP 1 Location: Left arm, BP Patient Position: Sitting) Pulse (!) 58 Temp 98 °F (36.7 °C) (Oral) Resp 17 Ht 5' 6\" (1.676 m) Wt 233 lb (105.7 kg) SpO2 98% BMI 37.61 kg/m² Physical Exam  
Constitutional: He is oriented to person, place, and time. He appears well-developed. No distress. Cardiovascular: Normal rate, regular rhythm and normal heart sounds. No murmur heard. Pulmonary/Chest: Effort normal and breath sounds normal. No respiratory distress. He has no wheezes. He has no rales. Neurological: He is alert and oriented to person, place, and time. No cranial nerve deficit. ASSESSMENT and PLAN 
  ICD-10-CM ICD-9-CM 1. Arthralgia, unspecified joint M25.50 719.40 MACK COMPREHENSIVE PANEL 2. Severe obesity with body mass index (BMI) of 35.0 to 39.9 with serious comorbidity (HCC) E66.01 278.01   
 
PLAN: 
We spent greater than 30 minutes going over diets. We discussed his BMI range and what would be ideal. 
We discussed diet and exercise. We discussed the history of the Keto diet and what it is being used for now, ie. Some seizure disorders.  We discussed other diets such as the Mediterranean diet and patient was given written info on this as well as low carb info. We discussed his thumb issues. He has gotten injections and will follow up with the specialist. 
 
I have discussed the diagnosis with the patient and the intended plan as seen in the above orders. The patient has received an after-visit summary and questions were answered concerning future plans. I have discussed medication side effects and warnings with the patient as well. Patient will call for further questions.

## 2019-03-25 LAB
CENTROMERE B AB SER-ACNC: <0.2 AI (ref 0–0.9)
CHROMATIN AB SERPL-ACNC: <0.2 AI (ref 0–0.9)
DSDNA AB SER-ACNC: <1 IU/ML (ref 0–9)
ENA JO1 AB SER-ACNC: <0.2 AI (ref 0–0.9)
ENA RNP AB SER-ACNC: <0.2 AI (ref 0–0.9)
ENA SCL70 AB SER-ACNC: <0.2 AI (ref 0–0.9)
ENA SM AB SER-ACNC: <0.2 AI (ref 0–0.9)
ENA SS-A AB SER-ACNC: <0.2 AI (ref 0–0.9)
ENA SS-B AB SER-ACNC: <0.2 AI (ref 0–0.9)
SEE BELOW, 164869: NORMAL

## 2019-03-25 NOTE — PROGRESS NOTES
Please advise Pt that his MACK test is negative. This is just a basic way to look at auto immune disorders.

## 2019-03-26 ENCOUNTER — OFFICE VISIT (OUTPATIENT)
Dept: ORTHOPEDIC SURGERY | Facility: CLINIC | Age: 39
End: 2019-03-26

## 2019-03-26 VITALS
DIASTOLIC BLOOD PRESSURE: 78 MMHG | WEIGHT: 239.4 LBS | OXYGEN SATURATION: 97 % | HEART RATE: 59 BPM | BODY MASS INDEX: 38.47 KG/M2 | TEMPERATURE: 97.1 F | SYSTOLIC BLOOD PRESSURE: 121 MMHG | RESPIRATION RATE: 18 BRPM | HEIGHT: 66 IN

## 2019-03-26 DIAGNOSIS — M19.031 ARTHRITIS OF RIGHT WRIST: Primary | ICD-10-CM

## 2019-03-26 DIAGNOSIS — M19.032 ARTHRITIS OF LEFT WRIST: ICD-10-CM

## 2019-03-26 DIAGNOSIS — M18.0 ARTHRITIS OF CARPOMETACARPAL (CMC) JOINT OF BOTH THUMBS: ICD-10-CM

## 2019-03-26 NOTE — PROGRESS NOTES
Lonell  is a 41 y.o. 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 Worker's Compensation and legal considerations: none filed. Vitals:  
 03/26/19 1450 BP: 121/78 Pulse: (!) 59 Resp: 18 Temp: 97.1 °F (36.2 °C) TempSrc: Oral  
SpO2: 97% Weight: 239 lb 6.4 oz (108.6 kg) Height: 5' 6\" (1.676 m) PainSc:   0 - No pain PainLoc: Hand Chief Complaint Patient presents with  
 Hand Pain Ze HPI: Patient comes in today for follow-up of bilateral thumb pain. He was previously injected a couple months ago and his CMC joints of bilateral thumbs. He said this briefly helped but overall he is continuing to have pain with certain activities especially gripping type of activities. He denies any injuries. He denies any pain today at rest. 
 
Initial HPI: Patient comes in today with complaints of bilateral thumb weakness. He reports to have some pain however minimal.  He localizes pain to the base of the thumb. He denies any injuries. He reports his biggest complaint to be after he is used his hand a lot not been able to get a good  on things. He also reports cracking his joint at the base of his thumb every day. Date of onset: For a few years worsening recently Injury: No 
 
Prior Treatment:  No 
 
Numbness/ Tingling: No 
 
ROS: Review of Systems - General ROS: negative Respiratory ROS: no cough, shortness of breath, or wheezing Cardiovascular ROS: no chest pain or dyspnea on exertion Musculoskeletal ROS: positive for - pain in hand - bilateral 
Neurological ROS: negative Dermatological ROS: negative History reviewed. No pertinent past medical history. History reviewed. No pertinent surgical history. Current Outpatient Medications Medication Sig Dispense Refill  triamcinolone acetonide (KENALOG) 10 mg/mL injection 1 mL by IntraMUSCular route once for 1 dose.  1 Vial 0  
 triamcinolone acetonide (KENALOG) 10 mg/mL injection 1 mL by IntraMUSCular route once for 1 dose. 1 Vial 0  
 OTHER Indications: Super Beet extract  buPROPion XL (WELLBUTRIN XL) 150 mg tablet Take 1 Tab by mouth every morning. 90 Tab 1  cyanocobalamin (VITAMIN B12) 500 mcg tablet Take 500 mcg by mouth daily.  glucosamine/chondr starr A sod (GLUCOSAMINE-CHONDROITIN) 750-600 mg tab Take  by mouth.  pantoprazole (PROTONIX) 40 mg tablet Take 1 Tab by mouth daily. 90 Tab 1  
 loratadine (CLARITIN) 10 mg tablet Take 10 mg by mouth.  meloxicam (MOBIC) 15 mg tablet Take 1 Tab by mouth daily (with breakfast). 90 Tab 1  
 orphenadrine citrate (NORFLEX) 100 mg sr tablet Take 1 Tab by mouth two (2) times a day. 30 Tab 1  clobetasol (TEMOVATE) 0.05 % external solution Use bid to affect area (scalp) for two weeks then decrease to twice a week for maintenance 50 mL 2  
 tadalafil (CIALIS) 20 mg tablet Take every 3 days 9 Tab 5 No Known Allergies PE:  
 
Hand: Today tenderness to palpation is not localized over the Aia 16 joints bilaterally but more proximal about the STT joint. However after significant manipulation of his Aia 16 joint she does have some pain there. Examination L Digit(s) R Digit(s) 1st CMC Tenderness ?+  ?+ 1st [address] -  - Priscilla Nodes -  -   
[place] Nodes -  -   
A1 Pulley Tenderness -  - Triggering -  -   
UCL Instability -  -   
RCL Instability -  - Lateral Stress Pain -  -   
Palmar Cords -  - Tabletop test -  -   
Garrod's Pads -  -   
 Strength Pinch Strength      
 
ROM: Full Imaging:  
 
Previous plain films of bilateral hands and cmc joints show only mild degenerative changes ICD-10-CM ICD-9-CM 1. Arthritis of right wrist M19.031 716.93 DRAIN/INJECT SMALL JOINT/BURSA TRIAMCINOLONE ACETONIDE INJ  
   triamcinolone acetonide (KENALOG) 10 mg/mL injection 2.  Arthritis of left wrist M19.032 716.93 TRIAMCINOLONE ACETONIDE INJ  
 triamcinolone acetonide (KENALOG) 10 mg/mL injection DRAIN/INJECT SMALL JOINT/BURSA 3. Arthritis of carpometacarpal (CMC) joint of both thumbs M18.11 716.94 DRAIN/INJECT SMALL JOINT/BURSA  
 M18.12  TRIAMCINOLONE ACETONIDE INJ  
   triamcinolone acetonide (KENALOG) 10 mg/mL injection TRIAMCINOLONE ACETONIDE INJ  
   triamcinolone acetonide (KENALOG) 10 mg/mL injection DRAIN/INJECT SMALL JOINT/BURSA Plan:  
 
Bilateral thumb joint injections at the STT joints. We will also try to inject the [facility] joints with this injection. I have instructed him to continue wearing his cool comfort braces bilaterally. He has not been wearing it at work due to wanting to get it dirty. He says he will try to wear it underneath his gloves. If these injections are not helpful may have to consider either further advanced imaging or different type of x-ray. Follow-up PRN. [facility] PROCEDURE PROGRESS NOTE Chart reviewed for the following: 
 Narendra CARVER DO, have reviewed the History, Physical and updated the Allergic reactions for Dale Roth TIME OUT performed immediately prior to start of procedure: 
 Narendra CARVER DO, have performed the following reviews on Betmaya Mckeonk prior to the start of the procedure: 
         
* Patient was identified by name and date of birth * Agreement on procedure being performed was verified * Risks and Benefits explained to the patient * Procedure site verified and marked as necessary * Patient was positioned for comfort * Consent was signed and verified Time: 15:40 Date of procedure: 3/26/2019 Procedure performed by: Cherelle Sommer DO 
 
Provider assisted by: Maykel Ortiz LPN Patient assisted by: self How tolerated by patient: tolerated the procedure well with no complications Post Procedural Pain Scale: 0 - No Hurt Comments: none Procedure: After consent was obtained, using sterile technique the joint was prepped. Local anesthetic used: 1% lidocaine. Kenalog 5 mg X2 and was then injected and the needle withdrawn. The procedure was well tolerated. The patient is asked to continue to rest the area for a few more days before resuming regular activities. It may be more painful for the first 1-2 days. Watch for fever, or increased swelling or persistent pain in the joint. Call or return to clinic prn if such symptoms occur or there is failure to improve as anticipated. Plan was reviewed with patient, who verbalized agreement and understanding of the plan

## 2019-03-27 ENCOUNTER — PATIENT MESSAGE (OUTPATIENT)
Dept: FAMILY MEDICINE CLINIC | Age: 39
End: 2019-03-27

## 2019-04-08 ENCOUNTER — TELEPHONE (OUTPATIENT)
Dept: FAMILY MEDICINE CLINIC | Age: 39
End: 2019-04-08

## 2019-04-08 NOTE — TELEPHONE ENCOUNTER
Pt calling because he received a my chart message that he has an appt with Dr Bautista Patient Neurology on 05/15/2019.      Pt has no knowledge of why he is being scheduled with neurology and wants to be sure S Vinicio didn't request it before he calls their office

## 2019-04-11 NOTE — TELEPHONE ENCOUNTER
Adelina Godoy   to Riley Jackson NP            6:36 PM   For some reason an appointment got scheduled for me yesterday with a neurologist on May 15th I believe. I didn't request this, and didn't know if it was a mistake or if you wanted me to see him    April 10, 2019              9:00 AM   Zev GONZALES routed this conversation to IRENE Mercado NP   to Adelina Godoy            9:15 AM   Yes, this is for the carpal tunnel symptoms. They are the ones who do the testing. Sangeetha      Last read by Sandoval Almaraz at 10:19 AM on 4/10/2019.    Adelina Godoy   to Riley Jackson NP            9:17 AM     [id number] Jessa Kang, thank you

## 2019-05-03 RX ORDER — PANTOPRAZOLE SODIUM 40 MG/1
TABLET, DELAYED RELEASE ORAL
Qty: 90 TAB | Refills: 1 | Status: SHIPPED | OUTPATIENT
Start: 2019-05-03

## 2019-05-15 ENCOUNTER — OFFICE VISIT (OUTPATIENT)
Dept: NEUROLOGY | Age: 39
End: 2019-05-15

## 2019-05-15 VITALS
DIASTOLIC BLOOD PRESSURE: 82 MMHG | WEIGHT: 241 LBS | HEART RATE: 59 BPM | HEIGHT: 66 IN | BODY MASS INDEX: 38.73 KG/M2 | RESPIRATION RATE: 16 BRPM | TEMPERATURE: 97.7 F | SYSTOLIC BLOOD PRESSURE: 130 MMHG | OXYGEN SATURATION: 98 %

## 2019-05-15 DIAGNOSIS — G47.10 HYPERSOMNIA: ICD-10-CM

## 2019-05-15 DIAGNOSIS — R41.89 COGNITIVE IMPAIRMENT: ICD-10-CM

## 2019-05-15 DIAGNOSIS — G47.8 UPPER AIRWAY RESISTANCE SYNDROME: ICD-10-CM

## 2019-05-15 DIAGNOSIS — M25.549 ARTHRALGIA OF HAND, UNSPECIFIED LATERALITY: ICD-10-CM

## 2019-05-15 DIAGNOSIS — G56.03 BILATERAL CARPAL TUNNEL SYNDROME: Primary | ICD-10-CM

## 2019-05-15 DIAGNOSIS — E66.01 MORBID OBESITY (HCC): ICD-10-CM

## 2019-05-15 NOTE — PROGRESS NOTES
ROOM # 4    Gilberto Moreno presents today for   Chief Complaint   Patient presents with   [address] Memory Loss       Gilberto Moreno preferred language for health care discussion is english/other. Depression Screening:  3 most recent [facility] 36 Screens 3/26/2019 1/22/2019 1/7/2019 12/21/2018 10/4/2018 8/8/2018 6/13/2018   PHQ Not Done Active Diagnosis of Depression or Bipolar Disorder - - - - - -   Little interest or pleasure in doing things Not at all Not at all Not at all Several days Several days Not at all Not at all   Feeling down, depressed, irritable, or hopeless Not at all Not at all Not at all Several days Several days Not at all Not at all   Total Score PHQ 2 0 0 0 2 2 0 0   Trouble falling or staying asleep, or sleeping too much - - - - More than half the days - -   Feeling tired or having little energy - - - - Nearly every day - -   Poor appetite, weight loss, or overeating - - - - Not at all - -   Feeling bad about yourself - or that you are a failure or have let yourself or your family down - - - - More than half the days - -   Trouble concentrating on things such as school, work, reading, or watching TV - - - - Not at all - -   Moving or speaking so slowly that other people could have noticed; or the opposite being so fidgety that others notice - - - - More than half the days - -   Thoughts of being better off dead, or hurting yourself in some way - - - - Not at all - -   PHQ 9 Score - - - - 11 - -   How difficult have these problems made it for you to do your work, take care of your home and get along with others - - - - Not difficult at all - -       Learning Assessment:  Learning Assessment 6/13/2018   PRIMARY LEARNER Patient   PRIMARY LANGUAGE ENGLISH   LEARNER PREFERENCE PRIMARY READING     LISTENING     DEMONSTRATION   ANSWERED BY patient   RELATIONSHIP SELF       Abuse Screening:  Abuse Screening Questionnaire 6/13/2018   Do you ever feel afraid of your partner?  N   Are you in a relationship with someone who physically or mentally threatens you? N   Is it safe for you to go home? Y       Fall Risk  No flowsheet data found. Visit Vitals  /82 (BP 1 Location: Left arm, BP Patient Position: Sitting)   Pulse (!) 59   Temp 97.7 °F (36.5 °C) (Oral)   Resp 16   Ht 5' 6\" (1.676 m)   Wt 241 lb (109.3 kg)   SpO2 98%   BMI 38.90 kg/m²       Health Maintenance reviewed and discussed per provider. Yes    Sidney Kendrick is due for   Health Maintenance Due   Topic Date Due    DTaP/Tdap/Td series (1 - Tdap) 07/13/2003     Please order/place referral if appropriate. Advance Directive:  1. Do you have an advance directive in place? Patient Reply: No    2. If not, would you like material regarding how to put one in place? Patient Reply: No    Coordination of Care:  1. Have you been to the ER, urgent care clinic since your last visit? Hospitalized since your last visit?  No

## 2019-05-15 NOTE — LETTER
5/15/19 Patient: Darline Bran YOB: 1980 Date of Visit: 5/15/2019 Estela Keller NP 
[address] [facility] [id number] [facility] [address] VIA In Basket Dear Estela Keller NP, Thank you for referring Mr. Darline Bran to [facility] for evaluation. My notes for this consultation are attached. If you have questions, please do not hesitate to call me. I look forward to following your patient along with you.  
 
 
Sincerely, 
 
Lily Cohen MD
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
## 2019-05-17 ENCOUNTER — HOSPITAL ENCOUNTER (OUTPATIENT)
Dept: LAB | Age: 39
Discharge: HOME | End: 2019-05-17
Payer: COMMERCIAL

## 2019-05-17 DIAGNOSIS — R41.89 COGNITIVE IMPAIRMENT: ICD-10-CM

## 2019-05-17 LAB
T4 FREE SERPL-MCNC: 1 NG/DL (ref 0.7–1.5)
TSH SERPL DL<=0.05 MIU/L-ACNC: 1.62 UIU/ML (ref 0.36–3.74)
VIT B12 SERPL-MCNC: 788 PG/ML (ref 211–911)

## 2019-05-17 PROCEDURE — 84439 ASSAY OF FREE THYROXINE: CPT

## 2019-05-17 PROCEDURE — 82607 VITAMIN B-12: CPT

## 2019-05-17 PROCEDURE — 36415 COLL VENOUS BLD VENIPUNCTURE: CPT

## 2019-05-23 ENCOUNTER — HOSPITAL ENCOUNTER (OUTPATIENT)
Dept: MRI IMAGING | Age: 39
Discharge: HOME | End: 2019-05-23
Attending: PSYCHIATRY & NEUROLOGY
Payer: COMMERCIAL

## 2019-05-23 DIAGNOSIS — R41.89 COGNITIVE IMPAIRMENT: ICD-10-CM

## 2019-05-23 PROCEDURE — 70551 MRI BRAIN STEM W/O DYE: CPT

## 2019-06-04 ENCOUNTER — OFFICE VISIT (OUTPATIENT)
Dept: NEUROLOGY | Age: 39
End: 2019-06-04

## 2019-06-04 VITALS
RESPIRATION RATE: 16 BRPM | SYSTOLIC BLOOD PRESSURE: 135 MMHG | OXYGEN SATURATION: 97 % | HEART RATE: 59 BPM | HEIGHT: 66 IN | WEIGHT: 246 LBS | BODY MASS INDEX: 39.53 KG/M2 | DIASTOLIC BLOOD PRESSURE: 85 MMHG | TEMPERATURE: 97.8 F

## 2019-06-04 DIAGNOSIS — E66.01 MORBID OBESITY (HCC): ICD-10-CM

## 2019-06-04 DIAGNOSIS — G47.8 UPPER AIRWAY RESISTANCE SYNDROME: ICD-10-CM

## 2019-06-04 DIAGNOSIS — R41.89 COGNITIVE IMPAIRMENT: ICD-10-CM

## 2019-06-04 DIAGNOSIS — G56.03 BILATERAL CARPAL TUNNEL SYNDROME: Primary | ICD-10-CM

## 2019-06-04 NOTE — PROGRESS NOTES
[facility]  [address] [phone]    Neurophysiology Report    Patient: Donavan Jha     ID: [id number] Physician: Ramesh Ji MD   Gender: Male Ref Phys: Ramesh Ji MD   Handedness: Lorrane Fam     Study Date: June 4, 2019         Patient History:  left thumb pain    Nerve Conduction Studies  Anti Sensory Summary Table     Stim Site NR Peak (ms) Norm Peak (ms) O-P Amp (µV) Norm O-P Amp Dist (cm) Gee (m/s)   Left Median 2nd Digit Anti Sensory (2nd Digit)   Wrist    3.0 <3.5 30.8 >20 13.0 54.2   Right Median 2nd Digit Anti Sensory (2nd Digit)   Wrist    3.3 <3.5 29.3 >20 13.0 50.0   Left Ulnar Anti Sensory (5th Digit )   Wrist    2.8 <3.1 32.1 >17.0 11.0 52.4   Right Ulnar Anti Sensory (5th Digit )   Wrist    2.7 <3.1 29.6 >17.0 11.0 57.9     Motor Summary Table     Stim Site NR Onset (ms) Norm Onset (ms) O-P Amp (mV) Norm O-P Amp Dist (cm) Gee (m/s) Norm Gee (m/s)   Left Median Motor (Abd Poll Brev)   Wrist    3.5 <4.4 8.2 >4.0 23.0 67.6 >49   Elbow    6.9  3.9  0.0  >48   Axilla    7.5  4.2       Right Median Motor (Abd Poll Brev)   Wrist    3.8 <4.4 7.6 >4.0 23.0 67.6 >49   Elbow    7.2  5.5       Left Ulnar Motor (Abd Dig Minimi )   Wrist    2.7 <3.3 8.5 >6.0 23.0 71.9 >49   B Elbow    5.9  10.5  13.0 68.4 >50   A Elbow    7.8  10.6       Right Ulnar Motor (Abd Dig Minimi )   Wrist    3.0 <3.3 7.6 >6.0 22.0 78.6 >49   B Elbow    5.8  9.7  13.0 68.4 >50   A Elbow    7.7  9.8               NCS/EMG FINDINGS:     Evaluation of the Left median motor, the Right median motor, the Left ulnar motor, the Right ulnar motor, the Left Median 2nd Digit sensory, the Right Median 2nd Digit sensory, the Left ulnar sensory, and the Right ulnar sensory nerves were unremarkable. INTERPRETATION:   Normal NCS of bilateral upper extremities. There is a likely ulnar to median nerve anastomosis at the forearms. ___________________________  Violeta Calvillo MD  Board Certified in Neurology          Waveforms:                    Susan Chirinos NOTE        Chart reviewed for the following:   Rosa Birmingham MD, have reviewed the History, Physical and updated the Allergic reactions for [address] performed immediately prior to start of procedure:   Rosa Birmingham MD, have performed the following reviews on [facility] prior to the start of the procedure:            * Patient was identified by name and date of birth   * Agreement on procedure being performed was verified  * Risks and Benefits explained to the patient  * Procedure site verified and marked as necessary  * Patient was positioned for comfort  * Consent was signed and verified     Time: 3:12 PM    Date of procedure: 6/4/2019    Procedure performed by:  PROCEDURE ROOM    Provider assisted by: Henry Zamora     Patient assisted by: self    How tolerated by patient: tolerated the procedure well with no complications    Post Procedural Pain Scale: 0 - No Hurt    Comments: none

## 2019-06-04 NOTE — PROGRESS NOTES
Basilio Noland presents today for   Chief Complaint   Patient presents with    Procedure     EMG BUE       Is someone accompanying this pt? No    Is the patient using any DME equipment during OV? No    Are there any discrepancies in patient's vital signs?   No

## 2019-06-04 NOTE — PROGRESS NOTES
6/4/2019 3:07 PM    SSN: [id number]    Subjective:   45-year-old male who I recently saw for evaluation of left thumb pain and cognitive impairment. Returns for follow-up of results and electrodiagnostic testing. His T SH was 1.62 with a free T4 at 1. Vitamin B12 was 788. I have personally reviewed an MRI of the brain which was completely normal.  He has a pending sleep study next month. Nerve conduction studies of bilateral upper extremities today are normal.      Social History     Socioeconomic History    Marital status:      Spouse name: Not on file    Number of children: Not on file    Years of education: Not on file    Highest education level: Not on file   Occupational History    Not on file   Social Needs    Financial resource strain: Not on file    Food insecurity:     Worry: Not on file     Inability: Not on file    Transportation needs:     Medical: Not on file     Non-medical: Not on file   Tobacco Use    Smoking status: Never Smoker    Smokeless tobacco: Current User   Substance and Sexual Activity    Alcohol use: No    Drug use: No    Sexual activity: Not on file   Lifestyle    Physical activity:     Days per week: Not on file     Minutes per session: Not on file    Stress: Not on file   Relationships    Social connections:     Talks on phone: Not on file     Gets together: Not on file     Attends Gnosticist service: Not on file     Active member of club or organization: Not on file     Attends meetings of clubs or organizations: Not on file     Relationship status: Not on file    Intimate partner violence:     Fear of current or ex partner: Not on file     Emotionally abused: Not on file     Physically abused: Not on file     Forced sexual activity: Not on file   Other Topics Concern    Not on file   Social History Narrative    Not on file       No family history on file.     Current Outpatient Medications   Medication Sig Dispense Refill    pantoprazole (PROTONIX) 40 mg tablet TAKE 1 TABLET BY MOUTH ONCE DAILY 90 Tab 1    buPROPion XL (WELLBUTRIN XL) 150 mg tablet Take 1 Tab by mouth every morning. 90 Tab 1    meloxicam (MOBIC) 15 mg tablet Take 1 Tab by mouth daily (with breakfast). 90 Tab 1    loratadine (CLARITIN) 10 mg tablet Take 10 mg by mouth.  OTHER Indications: Super Beet extract      orphenadrine citrate (NORFLEX) 100 mg sr tablet Take 1 Tab by mouth two (2) times a day. 30 Tab 1    cyanocobalamin (VITAMIN B12) 500 mcg tablet Take 500 mcg by mouth daily.  glucosamine/chondr starr A sod (GLUCOSAMINE-CHONDROITIN) 750-600 mg tab Take  by mouth.  clobetasol (TEMOVATE) 0.05 % external solution Use bid to affect area (scalp) for two weeks then decrease to twice a week for maintenance 50 mL 2    tadalafil (CIALIS) 20 mg tablet Take every 3 days 9 Tab 5       No past medical history on file. No past surgical history on file. No Known Allergies    Vital signs:    Visit Vitals  /85 (BP 1 Location: Left arm, BP Patient Position: Sitting)   Pulse (!) 59   Temp 97.8 °F (36.6 °C) (Oral)   Resp 16   Ht 5' 6\" (1.676 m)   Wt 111.6 kg (246 lb)   SpO2 97%   BMI 39.71 kg/m²       Review of Systems:   GENERAL: Denies fever or fatigue  CARDIAC: No CP or SOB  PULMONARY: No cough of SOB  MUSCULOSKELETAL: No new joint pain  NEURO: SEE HPI      EXAM: Alert, in NAD. Heart is regular. Oriented x3, EOM's are full, PERRL, no facial asymmetries. Strength and tone are normal. DTR's +2, gait symmetric           Assessment/Plan: EDS, fatigue, snoring, and cognitive impairment, suspect he has a significant amount of obstructive sleep apnea. He has a pending sleep study and he will come after this is done. I reassured her that his serology and neuroimaging are unremarkable for evaluation of his cognitive impairment.   I think his hand issues are more a thumb base tendinitis than a neurological issue, as he does not have evidence of median or or other neuropathies. PLEASE NOTE:   Portions of this document may have been produced using voice recognition software. Unrecognized errors in transcription may be present. This note will not be viewable in [address]

## 2019-07-02 ENCOUNTER — HOSPITAL ENCOUNTER (OUTPATIENT)
Dept: SLEEP MEDICINE | Age: 39
Discharge: HOME | End: 2019-07-02
Payer: COMMERCIAL

## 2019-07-02 DIAGNOSIS — G47.8 UPPER AIRWAY RESISTANCE SYNDROME: ICD-10-CM

## 2019-07-02 DIAGNOSIS — R41.89 COGNITIVE IMPAIRMENT: ICD-10-CM

## 2019-07-02 DIAGNOSIS — E66.01 MORBID OBESITY (HCC): ICD-10-CM

## 2019-07-02 DIAGNOSIS — G47.10 HYPERSOMNIA: ICD-10-CM

## 2019-07-02 PROCEDURE — 95810 POLYSOM 6/> YRS 4/> PARAM: CPT

## 2019-07-03 VITALS
WEIGHT: 243.8 LBS | DIASTOLIC BLOOD PRESSURE: 71 MMHG | BODY MASS INDEX: 39.18 KG/M2 | HEIGHT: 66 IN | HEART RATE: 54 BPM | SYSTOLIC BLOOD PRESSURE: 107 MMHG

## 2019-07-16 ENCOUNTER — OFFICE VISIT (OUTPATIENT)
Dept: NEUROLOGY | Age: 39
End: 2019-07-16

## 2019-07-16 VITALS
HEART RATE: 57 BPM | BODY MASS INDEX: 39.7 KG/M2 | RESPIRATION RATE: 16 BRPM | HEIGHT: 66 IN | OXYGEN SATURATION: 98 % | SYSTOLIC BLOOD PRESSURE: 130 MMHG | WEIGHT: 247 LBS | DIASTOLIC BLOOD PRESSURE: 78 MMHG | TEMPERATURE: 97.6 F

## 2019-07-16 DIAGNOSIS — E66.01 MORBID OBESITY (HCC): ICD-10-CM

## 2019-07-16 DIAGNOSIS — G47.33 OSA (OBSTRUCTIVE SLEEP APNEA): Primary | ICD-10-CM

## 2019-07-16 NOTE — PROGRESS NOTES
7/16/2019 2:51 PM    SSN: [id number]    Subjective: 30-year-old male coming for follow-up of symptoms of suspected obstructive sleep apnea. He had symptoms of excessive daytime sleepiness, snoring, and fatigue. A sleep study done on July 2 showed mild overall obstructive sleep apnea with an AHI of 14.3 which became worse during REM when AHI increased to 29 with desaturations down to a minimum of 83%. Social History     Socioeconomic History    Marital status:      Spouse name: Not on file    Number of children: Not on file    Years of education: Not on file    Highest education level: Not on file   Occupational History    Not on file   Social Needs    Financial resource strain: Not on file    Food insecurity:     Worry: Not on file     Inability: Not on file    Transportation needs:     Medical: Not on file     Non-medical: Not on file   Tobacco Use    Smoking status: Never Smoker    Smokeless tobacco: Current User   Substance and Sexual Activity    Alcohol use: No    Drug use: No    Sexual activity: Not on file   Lifestyle    Physical activity:     Days per week: Not on file     Minutes per session: Not on file    Stress: Not on file   Relationships    Social connections:     Talks on phone: Not on file     Gets together: Not on file     Attends Faith service: Not on file     Active member of club or organization: Not on file     Attends meetings of clubs or organizations: Not on file     Relationship status: Not on file    Intimate partner violence:     Fear of current or ex partner: Not on file     Emotionally abused: Not on file     Physically abused: Not on file     Forced sexual activity: Not on file   Other Topics Concern    Not on file   Social History Narrative    Not on file       History reviewed. No pertinent family history.     Current Outpatient Medications   Medication Sig Dispense Refill    pantoprazole (PROTONIX) 40 mg tablet TAKE 1 TABLET BY MOUTH ONCE DAILY 90 Tab 1    buPROPion XL (WELLBUTRIN XL) 150 mg tablet Take 1 Tab by mouth every morning. 90 Tab 1    clobetasol (TEMOVATE) 0.05 % external solution Use bid to affect area (scalp) for two weeks then decrease to twice a week for maintenance 50 mL 2    loratadine (CLARITIN) 10 mg tablet Take 10 mg by mouth.  OTHER Indications: Super Beet extract      meloxicam (MOBIC) 15 mg tablet Take 1 Tab by mouth daily (with breakfast). 90 Tab 1    orphenadrine citrate (NORFLEX) 100 mg sr tablet Take 1 Tab by mouth two (2) times a day. 30 Tab 1    cyanocobalamin (VITAMIN B12) 500 mcg tablet Take 500 mcg by mouth daily.  glucosamine/chondr starr A sod (GLUCOSAMINE-CHONDROITIN) 750-600 mg tab Take  by mouth.  tadalafil (CIALIS) 20 mg tablet Take every 3 days 9 Tab 5       History reviewed. No pertinent past medical history. History reviewed. No pertinent surgical history. No Known Allergies    Vital signs:    Visit Vitals  /78 (BP 1 Location: Left arm, BP Patient Position: Sitting)   Pulse (!) 57   Temp 97.6 °F (36.4 °C) (Oral)   Resp 16   Ht 5' 6\" (1.676 m)   Wt 112 kg (247 lb)   SpO2 98%   BMI 39.87 kg/m²       Review of Systems:   GENERAL: Denies fever or fatigue  CARDIAC: No CP or SOB  PULMONARY: No cough of SOB  MUSCULOSKELETAL: No new joint pain  NEURO: SEE HPI      EXAM: Alert, in NAD. Heart is regular. Oriented x3, EOM's are full, PERRL, no facial asymmetries. Strength and tone are normal. DTR's +2, gait symmetric           Assessment/Plan: Mild symptomatic obstructive sleep apnea syndrome, becoming moderate to severe during REM sleep. Discussed study results, CPAP pitfalls, HATTIE treatment options, weight management. Discussed consequences of untreated obstructive sleep apnea. Will proceed with a CPAP titration trial and f/u afterwards. PLEASE NOTE:   Portions of this document may have been produced using voice recognition software.  Unrecognized errors in transcription may be present. This note will not be viewable in [address]

## 2019-07-31 ENCOUNTER — OFFICE VISIT (OUTPATIENT)
Dept: FAMILY MEDICINE CLINIC | Age: 39
End: 2019-07-31

## 2019-07-31 VITALS
TEMPERATURE: 97 F | BODY MASS INDEX: 39.41 KG/M2 | HEART RATE: 58 BPM | WEIGHT: 245.2 LBS | HEIGHT: 66 IN | DIASTOLIC BLOOD PRESSURE: 88 MMHG | OXYGEN SATURATION: 98 % | RESPIRATION RATE: 16 BRPM | SYSTOLIC BLOOD PRESSURE: 119 MMHG

## 2019-07-31 DIAGNOSIS — G47.33 OBSTRUCTIVE SLEEP APNEA: ICD-10-CM

## 2019-07-31 DIAGNOSIS — M17.11 PRIMARY OSTEOARTHRITIS OF RIGHT KNEE: ICD-10-CM

## 2019-07-31 DIAGNOSIS — K21.9 GASTROESOPHAGEAL REFLUX DISEASE WITHOUT ESOPHAGITIS: Primary | ICD-10-CM

## 2019-07-31 NOTE — PROGRESS NOTES
HISTORY OF PRESENT ILLNESS  Sathya Souza is a 42 y.o. male. Patient presents for follow up on depression and GERD    HPI  [facility]. He is at about 50-60% disability right now. Pt saw neurology who did a sleep study. He is going to get CPAP. He had a EMG study done. Both thumbs feel better. Review of Systems   Constitutional: Negative. Respiratory: Negative. Cardiovascular: Negative. Musculoskeletal: Positive for joint pain (thumb pain resolved). Visit Vitals  /88 (BP 1 Location: Left arm)   Pulse (!) 58   Temp 97 °F (36.1 °C) (Oral)   Resp 16   Ht 5' 6\" (1.676 m)   Wt 245 lb 3.2 oz (111.2 kg)   SpO2 98%   BMI 39.58 kg/m²       Physical Exam   Constitutional: He is oriented to person, place, and time. He appears well-developed. No distress. Cardiovascular: Normal rate, regular rhythm and normal heart sounds. No murmur heard. Pulmonary/Chest: Effort normal and breath sounds normal. No respiratory distress. He has no wheezes. He has no rales. Neurological: He is alert and oriented to person, place, and time. ASSESSMENT and PLAN    ICD-10-CM ICD-9-CM    1. Gastroesophageal reflux disease without esophagitis K21.9 530.81    2. Primary osteoarthritis of right knee M17.11 715.16    3. Obstructive sleep apnea G47.33 327.23      PLAN:  We reviewed his specialist visits. Pt will RTC as needed. I have discussed the diagnosis with the patient and the intended plan as seen in the above orders. The patient has received an after-visit summary and questions were answered concerning future plans. I have discussed medication side effects and warnings with the patient as well. Patient will call for further questions. Follow-up and Dispositions    · Return if symptoms worsen or fail to improve.

## 2019-07-31 NOTE — PROGRESS NOTES
Pt is here for 6 month follow up GERD, depression    1. Have you been to the ER, urgent care clinic since your last visit? Hospitalized since your last visit? No    2. Have you seen or consulted any other health care providers outside of the [address] [place] [address] since your last visit? Include any pap smears or colon screening.  No

## 2019-09-11 ENCOUNTER — OFFICE VISIT (OUTPATIENT)
Dept: ORTHOPEDIC SURGERY | Age: 39
End: 2019-09-11

## 2019-09-11 VITALS
RESPIRATION RATE: 16 BRPM | HEIGHT: 66 IN | WEIGHT: 249 LBS | TEMPERATURE: 97.2 F | DIASTOLIC BLOOD PRESSURE: 82 MMHG | BODY MASS INDEX: 40.02 KG/M2 | OXYGEN SATURATION: 98 % | HEART RATE: 67 BPM | SYSTOLIC BLOOD PRESSURE: 120 MMHG

## 2019-09-11 DIAGNOSIS — M75.42 IMPINGEMENT SYNDROME OF LEFT SHOULDER: Primary | ICD-10-CM

## 2019-09-11 DIAGNOSIS — M25.512 LEFT SHOULDER PAIN, UNSPECIFIED CHRONICITY: ICD-10-CM

## 2019-09-11 RX ORDER — TRIAMCINOLONE ACETONIDE 40 MG/ML
40 INJECTION, SUSPENSION INTRA-ARTICULAR; INTRAMUSCULAR ONCE
Qty: 1 ML | Refills: 0
Start: 2019-09-11 | End: 2019-09-11

## 2019-09-11 NOTE — PROGRESS NOTES
Raul Lund  1980   Chief Complaint   Patient presents with    Shoulder Pain     left shoulder pain        HISTORY OF PRESENT ILLNESS  Raul Lund is a 42 y.o. male who presents today for evaluation of left shoulder pain. He states over last few weeks his left shoulder pain has worsened. Has pain with overhead reaching and lifting. One week ago pain was severe. He started icing and taking meloxicam and it has slightly improved. Able to move his arm more. Patient describes the pain as aching, sharp and stabbing that is Intermittent in nature. Symptoms are worse with overhead reaching and lifting and is better with  rest.  Associated symptoms include weakness. Since problem started, it: has slightly improved. Pain does wake patient up at night. Has taken mobic and ice for the problem. Pain is a 4/10. Has tried following treatments: Injections:NO; Brace:NO; Therapy:NO; Cane/Crutch:NO       No Known Allergies     History reviewed. No pertinent past medical history.    Social History     Socioeconomic History    Marital status:      Spouse name: Not on file    Number of children: Not on file    Years of education: Not on file    Highest education level: Not on file   Occupational History    Not on file   Social Needs    Financial resource strain: Not on file    Food insecurity:     Worry: Not on file     Inability: Not on file    Transportation needs:     Medical: Not on file     Non-medical: Not on file   Tobacco Use    Smoking status: Never Smoker    Smokeless tobacco: Current User   Substance and Sexual Activity    Alcohol use: No    Drug use: No    Sexual activity: Not on file   Lifestyle    Physical activity:     Days per week: Not on file     Minutes per session: Not on file    Stress: Not on file   Relationships    Social connections:     Talks on phone: Not on file     Gets together: Not on file     Attends Taoist service: Not on file     Active member of club or organization: Not on file     Attends meetings of clubs or organizations: Not on file     Relationship status: Not on file    Intimate partner violence:     Fear of current or ex partner: Not on file     Emotionally abused: Not on file     Physically abused: Not on file     Forced sexual activity: Not on file   Other Topics Concern    Not on file   Social History Narrative    Not on file      History reviewed. No pertinent surgical history. History reviewed. No pertinent family history. Current Outpatient Medications   Medication Sig    triamcinolone acetonide (KENALOG) 40 mg/mL injection 1 mL by IntraBURSal route once for 1 dose.  pantoprazole (PROTONIX) 40 mg tablet TAKE 1 TABLET BY MOUTH ONCE DAILY    OTHER Indications: Super Beet extract    buPROPion XL (WELLBUTRIN XL) 150 mg tablet Take 1 Tab by mouth every morning.  meloxicam (MOBIC) 15 mg tablet Take 1 Tab by mouth daily (with breakfast).  clobetasol (TEMOVATE) 0.05 % external solution Use bid to affect area (scalp) for two weeks then decrease to twice a week for maintenance    loratadine (CLARITIN) 10 mg tablet Take 10 mg by mouth. No current facility-administered medications for this visit. REVIEW OF SYSTEM   Patient denies: Weight loss, Fever/Chills, HA, Visual changes, Fatigue, Chest pain, SOB, Abdominal pain, N/V/D/C, Blood in stool or urine, Edema. Pertinent positive as above in HPI. All others were negative    PHYSICAL EXAM:   Visit Vitals  /82 (BP 1 Location: Right arm, BP Patient Position: Sitting)   Pulse 67   Temp 97.2 °F (36.2 °C) (Oral)   Resp 16   Ht 5' 6\" (1.676 m)   Wt 249 lb (112.9 kg)   SpO2 98%   BMI 40.19 kg/m²     The patient is a well-developed, well-nourished male   in no acute distress. The patient is alert and oriented times three. The patient is alert and oriented times three.  Mood and affect are normal.  LYMPHATIC: lymph nodes are not enlarged and are within normal limits  SKIN: normal in color and non tender to palpation. There are no bruises or abrasions noted. NEUROLOGICAL: Motor sensory exam is within normal limits. Reflexes are equal bilaterally. There is normal sensation to pinprick and light touch  MUSCULOSKELETAL:  Examination Left shoulder   Skin Intact   AC joint tenderness -   Biceps tenderness -   Forward flexion/Elevation    Active abduction    Glenohumeral abduction 90   External rotation ROM 80   Internal rotation ROM 60   Apprehension -   Irmas Relocation -   Jerk -   Load and Shift -   Obriens -   Speeds -   Impingement sign +   Supraspinatus/Empty Can -, 5/5   External Rotation Strength -, 5/5   Lift Off/Belly Press -, 5/5   Neurovascular Intact          PROCEDURE: Left shoulder Injection    Indication:Left shoulder pain/swelling    After sterile prep, 6 cc of Xylocaine and 1 cc of Kenalog were injected into the left shoulder.        [facility]  OFFICE PROCEDURE PROGRESS NOTE        Chart reviewed for the following:  Yariel CARVER PA, have reviewed the History, Physical and updated the Allergic reactions for [address] performed immediately prior to start of procedure:  Yariel CARVER PA-C, have performed the following reviews on Keyla Kimble prior to the start of the procedure:            * Patient was identified by name and date of birth   * Agreement on procedure being performed was verified  * Risks and Benefits explained to the patient  * Procedure site verified and marked as necessary  * Patient was positioned for comfort  * Consent was signed and verified     Time: 3:25 PM    Date of procedure: 9/11/2019    Procedure performed by:  CESILIA Chaves    Provider assisted by: (see medication administration)    How tolerated by patient: tolerated the procedure well with no complications    Comments: none     IMAGING: 3 view xray images of left shoulder on 9/11/2019 read and reviewed by myself reveal no acute abnormalities     IMPRESSION:      ICD-10-CM ICD-9-CM    1. Impingement syndrome of left shoulder M75.42 726.2 TRIAMCINOLONE ACETONIDE INJ      DRAIN/INJECT LARGE JOINT/BURSA   2. Left shoulder pain, unspecified chronicity M25.512 719.41 AMB POC XRAY, SHOULDER; COMPLETE, 2+        PLAN:   1. Left shoulder pain c/w impingement syndrome. Will inject with cortisone today. Cont mobic. Risk factors include: BMI>35  2. Yes cortisone injection indicated today   3. No Physical/Occupational Therapy indicated today  4. No diagnostic test indicated today:   5. No durable medical equipment indicated today  6. No referral indicated today   7. Yes medications indicated today: mobic  8.  No Narcotic indicated today   RTC 3 weeks if pain persists     [facility]

## 2019-09-11 NOTE — PROGRESS NOTES
1. Have you been to the ER, urgent care clinic since your last visit? Hospitalized since your last visit? NO    2. Have you seen or consulted any other health care providers outside of the [address] [place] [address] since your last visit? Include any pap smears or colon screening.  NO

## 2019-09-18 ENCOUNTER — OFFICE VISIT (OUTPATIENT)
Dept: FAMILY MEDICINE CLINIC | Age: 39
End: 2019-09-18

## 2019-09-18 VITALS
TEMPERATURE: 98.3 F | OXYGEN SATURATION: 97 % | WEIGHT: 247 LBS | RESPIRATION RATE: 16 BRPM | HEIGHT: 66 IN | HEART RATE: 62 BPM | SYSTOLIC BLOOD PRESSURE: 123 MMHG | BODY MASS INDEX: 39.7 KG/M2 | DIASTOLIC BLOOD PRESSURE: 74 MMHG

## 2019-09-18 DIAGNOSIS — R39.11 URINARY HESITANCY: Primary | ICD-10-CM

## 2019-09-18 DIAGNOSIS — N41.0 ACUTE PROSTATITIS: ICD-10-CM

## 2019-09-18 DIAGNOSIS — F52.4 PREMATURE EJACULATION: ICD-10-CM

## 2019-09-18 LAB
BILIRUB UR QL STRIP: NEGATIVE
GLUCOSE UR-MCNC: NEGATIVE MG/DL
KETONES P FAST UR STRIP-MCNC: NEGATIVE MG/DL
PH UR STRIP: 5 [PH] (ref 4.6–8)
PROT UR QL STRIP: NEGATIVE
SP GR UR STRIP: 1.02 (ref 1–1.03)
UA UROBILINOGEN AMB POC: NORMAL (ref 0.2–1)
URINALYSIS CLARITY POC: CLEAR
URINALYSIS COLOR POC: YELLOW
URINE BLOOD POC: NORMAL
URINE LEUKOCYTES POC: NEGATIVE
URINE NITRITES POC: NEGATIVE

## 2019-09-18 RX ORDER — CIPROFLOXACIN 500 MG/1
500 TABLET ORAL 2 TIMES DAILY
Qty: 20 TAB | Refills: 0 | Status: SHIPPED | OUTPATIENT
Start: 2019-09-18 | End: 2019-09-28

## 2019-09-18 NOTE — PROGRESS NOTES
Pt is here for urinary hesitancy since last week. Pt states he has a history of this. 1. Have you been to the ER, urgent care clinic since your last visit? Hospitalized since your last visit? No    2. Have you seen or consulted any other health care providers outside of the [address] [place] [address] since your last visit? Include any pap smears or colon screening.  No      .medication no longer used removed by verbral read back order Leopoldo Ruth, NP

## 2019-09-18 NOTE — PROGRESS NOTES
HISTORY OF PRESENT ILLNESS  Nancy Carrera is a 42 y.o. male. Patient presents with urinary concerns. HPI  Pt has urinary hesitancy for a week now. Pt has retrograde ejactulation    Review of Systems   Constitutional: Negative. Respiratory: Negative. Cardiovascular: Negative. Genitourinary: Positive for frequency. Visit Vitals  /74 (BP 1 Location: Left arm)   Pulse 62   Temp 98.3 °F (36.8 °C) (Oral)   Resp 16   Ht 5' 6\" (1.676 m)   Wt 247 lb (112 kg)   SpO2 97%   BMI 39.87 kg/m²       Physical Exam   Constitutional: He appears well-developed. No distress. Cardiovascular: Normal rate, regular rhythm and normal heart sounds. No murmur heard. Pulmonary/Chest: Effort normal and breath sounds normal. No respiratory distress. He has no wheezes. He has no rales. ASSESSMENT and PLAN    ICD-10-CM ICD-9-CM    1. Urinary hesitancy R39.11 788.64 AMB POC URINALYSIS DIP STICK AUTO W/ MICRO      REFERRAL TO UROLOGY   2. Premature ejaculation F52.4 302.75 REFERRAL TO UROLOGY   3. Acute prostatitis N41.0 601.0 ciprofloxacin HCl (CIPRO) 500 mg tablet      REFERRAL TO UROLOGY      PLAN:  Pt aware of UA results  Pt given ref to urology. Pt is aware he is being treated for a suspected possible prostatitis. Pt is to call with any concerns. I have discussed the diagnosis with the patient and the intended plan as seen in the above orders. The patient has received an after-visit summary and questions were answered concerning future plans. I have discussed medication side effects and warnings with the patient as well. Patient will call for further questions. Follow-up and Dispositions    · Return if symptoms worsen or fail to improve.

## 2019-10-10 ENCOUNTER — OFFICE VISIT (OUTPATIENT)
Dept: UROLOGY | Age: 39
End: 2019-10-10

## 2019-10-10 VITALS
WEIGHT: 246 LBS | HEIGHT: 66 IN | OXYGEN SATURATION: 98 % | BODY MASS INDEX: 39.53 KG/M2 | DIASTOLIC BLOOD PRESSURE: 78 MMHG | SYSTOLIC BLOOD PRESSURE: 118 MMHG | HEART RATE: 64 BPM

## 2019-10-10 DIAGNOSIS — R35.1 NOCTURIA: ICD-10-CM

## 2019-10-10 DIAGNOSIS — R39.11 URINARY HESITANCY: Primary | ICD-10-CM

## 2019-10-10 DIAGNOSIS — R35.0 URINARY FREQUENCY: ICD-10-CM

## 2019-10-10 LAB
BILIRUB UR QL STRIP: NEGATIVE
GLUCOSE UR-MCNC: NEGATIVE MG/DL
KETONES P FAST UR STRIP-MCNC: NEGATIVE MG/DL
PH UR STRIP: 7.5 [PH] (ref 4.6–8)
PROT UR QL STRIP: NEGATIVE
SP GR UR STRIP: 1.02 (ref 1–1.03)
UA UROBILINOGEN AMB POC: NORMAL (ref 0.2–1)
URINALYSIS CLARITY POC: CLEAR
URINALYSIS COLOR POC: YELLOW
URINE BLOOD POC: NEGATIVE
URINE LEUKOCYTES POC: NEGATIVE
URINE NITRITES POC: NEGATIVE

## 2019-10-10 RX ORDER — LANOLIN ALCOHOL/MO/W.PET/CERES
3 CREAM (GRAM) TOPICAL
COMMUNITY

## 2019-10-10 NOTE — PATIENT INSTRUCTIONS
Urine Test: About This Test  What is it? A urine test checks the color, clarity (clear or cloudy), odor, concentration, and acidity (pH) of your urine. It also checks your levels of protein, sugar, blood cells, or other substances in your urine. This test is sometimes called a urinalysis. Why is this test done? A urine test may be done:  · To check for a disease or infection of the urinary tract. The urinary tract includes the kidneys, the tubes that carry urine from the kidneys to the bladder (ureters), and the bladder. It also includes the tube that carries urine from the bladder to outside the body (urethra). · To check the treatment of conditions such as diabetes, kidney stones, a urinary tract infection (UTI), high blood pressure, or some kidney or liver diseases. How can you prepare for the test?  · Before the test, don't eat foods that can change the color of your urine. Examples of these include blackberries, beets, and rhubarb. · Don't do heavy exercise before the test.  · Tell your doctor if you are menstruating or close to starting your period. Your doctor may want to wait to do the test.  · Tell your doctor about all the nonprescription and prescription medicines and herbs or other supplements you take. Some of these can affect the results of this test.  What happens during the test?  A urine test can be done in your doctor's office, clinic, or lab. Or you may be asked to collect a urine sample at home. Then you can take it to the office or lab for testing. Clean-catch midstream urine collection  · Wash your hands before you start. · If the cup you are given has a lid, remove it carefully. Set it down with the inner surface up. Don't touch the inside of the cup with your fingers. · Clean the area around your genitals. ? For men: Pull back the foreskin, if present. Clean the head of your penis with medicated towelettes or swabs. ?  For women: Spread open the genital folds of skin with one hand. Then use medicated towelettes or swabs in your other hand to clean the area where urine comes out (the urethra). Wipe the area from front to back. · Start urinating into the toilet or urinal. A woman should hold apart the genital folds of skin while she urinates. · After the urine has flowed for several seconds, place the cup into the urine stream. Collect about 2 ounces of urine without stopping your flow of urine. · Don't touch the rim of the cup to your genital area. Don't get toilet paper, pubic hair, stool (feces), menstrual blood, or anything else in the urine sample. · Finish urinating into the toilet or urinal.  · Carefully replace and tighten the lid on the cup, and then return it to the lab. If you are collecting the urine at home and can't get it to the lab in an hour, refrigerate it. Double-voided urine sample collection  This method collects the urine your body is making right now. · Urinate into the toilet or urinal. Don't collect any of this urine. · Drink a large glass of water, and wait about 30 to 40 minutes. · Then get a urine sample. Follow the instructions above for collecting a clean-catch urine sample. · Take the urine sample to the lab. If you are collecting the urine at home and can't get it to the lab in an hour, refrigerate it. Follow-up care is a key part of your treatment and safety. Be sure to make and go to all appointments, and call your doctor if you are having problems. It's also a good idea to keep a list of the medicines you take. Ask your doctor when you can expect to have your test results. Where can you learn more? Go to http://gabriella-juany.info/. Enter R266 in the search box to learn more about \"Urine Test: About This Test.\"  Current as of: March 28, 2019  Content Version: 12.2  © 9809-6274 [employer].  Care instructions adapted under license by [employer] (which disclaims liability or warranty for this information). If you have questions about a medical condition or this instruction, always ask your healthcare professional. [place] [address] any warranty or liability for your use of this information.

## 2019-10-10 NOTE — PROGRESS NOTES
MrSoco Hunter has a reminder for a \"due or due soon\" health maintenance. I have asked that he contact his primary care provider for follow-up on this health maintenance.

## 2019-10-10 NOTE — PROGRESS NOTES
Sun Guo    Chief Complaint   Patient presents with    New Patient    Urinary Hesitancy    Incomplete Bladder Emptying    Urinary Frequency    Nocturia       History and Physical    The patient is a 29-year-old male  who comes in with difficulty urinating. This been going on for many years. The patient will have very slow hesitant urine and oftentimes feels that he is out. There are times when he strains so hard that there is what looks like semen in the urine. Additionally, the patient does not have any fluid with ejaculation. He dates all of these issues back to when he underwent emergency colon resection for what sounds like rupture of diverticulitis abscess. The patient did not require a temporary diverting colostomy. However, the patient was catheterized for an extended period of time and reports an event that sounds as though the balloon was removed and completely deflated and was quite traumatic and painful    History reviewed. No pertinent past medical history. Patient Active Problem List   Diagnosis Code    Severe obesity (BMI 35.0-39. 9) E66.01    Gastroesophageal reflux disease without esophagitis K21.9    Seasonal allergic rhinitis J30.2     History reviewed. No pertinent surgical history. Current Outpatient Medications   Medication Sig Dispense Refill    melatonin 3 mg tablet Take  by mouth.  pantoprazole (PROTONIX) 40 mg tablet TAKE 1 TABLET BY MOUTH ONCE DAILY 90 Tab 1    buPROPion XL (WELLBUTRIN XL) 150 mg tablet Take 1 Tab by mouth every morning. 90 Tab 1    meloxicam (MOBIC) 15 mg tablet Take 1 Tab by mouth daily (with breakfast). 90 Tab 1    loratadine (CLARITIN) 10 mg tablet Take 10 mg by mouth.       clobetasol (TEMOVATE) 0.05 % external solution Use bid to affect area (scalp) for two weeks then decrease to twice a week for maintenance 50 mL 2     No Known Allergies  Social History     Socioeconomic History    Marital status:      Spouse name: Not on file    Number of children: Not on file    Years of education: Not on file    Highest education level: Not on file   Occupational History    Not on file   Social Needs    Financial resource strain: Not on file    Food insecurity:     Worry: Not on file     Inability: Not on file    Transportation needs:     Medical: Not on file     Non-medical: Not on file   Tobacco Use    Smoking status: Never Smoker    Smokeless tobacco: Current User   Substance and Sexual Activity    Alcohol use:  Yes    Drug use: No    Sexual activity: Yes   Lifestyle    Physical activity:     Days per week: Not on file     Minutes per session: Not on file    Stress: Not on file   Relationships    Social connections:     Talks on phone: Not on file     Gets together: Not on file     Attends Gnosticist service: Not on file     Active member of club or organization: Not on file     Attends meetings of clubs or organizations: Not on file     Relationship status: Not on file    Intimate partner violence:     Fear of current or ex partner: Not on file     Emotionally abused: Not on file     Physically abused: Not on file     Forced sexual activity: Not on file   Other Topics Concern    Not on file   Social History Narrative    Not on file      Family History   Problem Relation Age of Onset    Cancer Maternal Grandfather                  Visit Vitals  /78 (BP 1 Location: Left arm, BP Patient Position: Sitting)   Pulse 64   Ht 5' 6\" (1.676 m)   Wt 246 lb (111.6 kg)   SpO2 98%   BMI 39.71 kg/m²     Physical        Gen: WDWN adult NAD  Head  : normocephalic,  Normal ROM; eyes with normal pupils, EOMs, no masses;  conjunctiva normal  Neck: normal movement,  no evident mass,  No evident adenopathy, trachea midline,  Lungs: no respiratory distress or difficulties  CV:  No evident peripheral swelling  Abd :bowel sounds normal, no masses, tenderness, organomegaly  Flanks     -penis normal.  Scrotal contents reveal normal size and texture of testes with normal epididymis and vasa. No hernia present. Rectal tone reveals normal tone and prostate of perhaps 15 g volume and it is normal    Extremities- no edema, arthritis, deformity, swelling  Psych- oriented, no evident anxiety, no cognitive impairment evident      Urinalysis is negative although the patient states that there have been times when he is provided urine specimens and have shown blood  Post void bladder scan 34 cc            Impression/ PLAN  The dry ejaculation and difficulty voiding following what sounds like a traumatic catheterization event    Plan:  Fully discussed with the patient. The patient is at high risk for stricture disease somewhere along the line and most likely also has some type of bladder neck contracture or dysfunction. The patient needs to be scheduled for anesthesia cystoscopy preceded by retrograde urethrogram.  The patient is aware of the potential for direct vision optical internal urethrotomy and the possibility of needing to resect the bladder neck contracture. The patient is aware of the scar tissue involving the membranous urethra cannot be cut. The patient is aware of the risk of recurrence and the fact that a bladder neck contracture, completely resected, it will not reverse his retrograde ejaculation. The patient states that he has one child and they are not interested at this age and having any further children    The nature of the procedure and the risks including possible prolonged catheterization and the possibility for recurrence is reviewed with the patient      This visit exceeded 30 minutes and >50% was counselling  The patient understands the discussion and plan    PLEASE NOTE:      This document has been produced using voice recognition software.   Unrecognized errors in transcription may be present    Jannet Espinoza MD

## 2019-10-15 ENCOUNTER — OFFICE VISIT (OUTPATIENT)
Dept: ORTHOPEDIC SURGERY | Age: 39
End: 2019-10-15

## 2019-10-15 VITALS
DIASTOLIC BLOOD PRESSURE: 83 MMHG | HEIGHT: 66 IN | BODY MASS INDEX: 40.18 KG/M2 | RESPIRATION RATE: 18 BRPM | OXYGEN SATURATION: 95 % | TEMPERATURE: 97.1 F | HEART RATE: 71 BPM | SYSTOLIC BLOOD PRESSURE: 141 MMHG | WEIGHT: 250 LBS

## 2019-10-15 DIAGNOSIS — M75.102 TEAR OF LEFT ROTATOR CUFF, UNSPECIFIED TEAR EXTENT, UNSPECIFIED WHETHER TRAUMATIC: Primary | ICD-10-CM

## 2019-10-15 NOTE — PROGRESS NOTES
1. Have you been to the ER, urgent care clinic since your last visit? Hospitalized since your last visit? No    2. Have you seen or consulted any other health care providers outside of the [address] [place] [address] since your last visit? Include any pap smears or colon screening.  No

## 2019-10-15 NOTE — PROGRESS NOTES
Te Suresh  1980   Chief Complaint   Patient presents with    Shoulder Pain     left shoulder        HISTORY OF PRESENT ILLNESS  Te Suresh is a 42 y.o. male who presents today for reevaluation of left shoulder pain. Patient rates pain as 7/10 today. Pain has been present for over a month. At last OV on 9/11/19, patient had a cortisone injection which provided relief but the pain has returned. Pt reports a sharp pain has returned in his shoulder over the last couple of days. He has not been moving his arm due to the pain. He reports he had trouble sleeping last night because of the pain. Patient denies any fever, chills, chest pain, shortness of breath or calf pain. The remainder of the review of systems is negative. There are no new illness or injuries to report since last seen in the office. There are no changes to medications, allergies, family or social history. Pain Assessment  10/15/2019   Location of Pain Shoulder   Pain Location Comment -   Location Modifiers Left   Severity of Pain 7   Quality of Pain Sharp;Dull   Duration of Pain Persistent   Frequency of Pain Constant   Aggravating Factors Bending;Stretching;Straightening   Aggravating Factors Comment -   Limiting Behavior -   Relieving Factors Nothing   Result of Injury No   Type of Injury -     PHYSICAL EXAM:   Visit Vitals  /83   Pulse 71   Temp 97.1 °F (36.2 °C) (Oral)   Resp 18   Ht 5' 6\" (1.676 m)   Wt 250 lb (113.4 kg)   SpO2 95%   BMI 40.35 kg/m²     The patient is a well-developed, well-nourished male   in no acute distress. The patient is alert and oriented times three. The patient is alert and oriented times three. Mood and affect are normal.  LYMPHATIC: lymph nodes are not enlarged and are within normal limits  SKIN: normal in color and non tender to palpation. There are no bruises or abrasions noted. NEUROLOGICAL: Motor sensory exam is within normal limits. Reflexes are equal bilaterally.  There is normal sensation to pinprick and light touch  MUSCULOSKELETAL:  Examination Left shoulder   Skin Intact   AC joint tenderness -   Biceps tenderness -   Forward flexion/Elevation    Active abduction    Glenohumeral abduction 90   External rotation ROM 80   Internal rotation ROM 60   Apprehension -   Irmas Relocation -   Jerk -   Load and Shift -   Obriens -   Speeds -   Impingement sign +   Supraspinatus/Empty Can -, 5/5   External Rotation Strength -, 5/5   Lift Off/Belly Press -, 5/5   Neurovascular Intact       IMAGING: 3 view xray images of left shoulder on 9/11/2019 read and reviewed by Debi Bence, PA, reveal: no acute abnormalities     IMPRESSION:      ICD-10-CM ICD-9-CM    1. Tear of left rotator cuff, unspecified tear extent, unspecified whether traumatic M75.102 840.4 MRI SHOULDER LT WO CONT        PLAN:   1. Pt presents today with left shoulder pain and I would like to get an MRI to r/o a rotator cuff tear. I advised him to keep moving his arm to avoid getting adhesive capsulitis. Risk factors include: BMI >35  2. No ultrasound exam indicated today  3. No cortisone injection indicated today   4. No Physical/Occupational Therapy indicated today  5. Yes diagnostic test indicated today: MRI L SHOULDER  6. No durable medical equipment indicated today  7. No referral indicated today   8. No medications indicated today:   9. No Narcotic indicated today     RTC following MRI      Scribed by Xena Wilson) as dictated by Tamiko Stephens MD    I, Dr. Tamiko Stephens, confirm that all documentation is accurate.     Tamiko Stephens M.D.   [facility]

## 2019-10-16 ENCOUNTER — TELEPHONE (OUTPATIENT)
Dept: ORTHOPEDIC SURGERY | Age: 39
End: 2019-10-16

## 2019-10-16 NOTE — TELEPHONE ENCOUNTER
Patient called today asking if [facility] can prescribe pain medication. Patient was transferred to central scheduling to schedule his MRI. Patient asked if there is anything he can take in the meantime. He advised pain is excruciating and he cant sleep at night.      Preferred Pharmacy:   [address] [place] [address], [address] [facility]      Patient can be reached at: [phone]

## 2019-10-24 RX ORDER — CIPROFLOXACIN 2 MG/ML
400 INJECTION, SOLUTION INTRAVENOUS ONCE
Status: CANCELLED | OUTPATIENT
Start: 2019-10-24 | End: 2019-10-24

## 2019-10-24 RX ORDER — SODIUM CHLORIDE 9 MG/ML
125 INJECTION, SOLUTION INTRAVENOUS CONTINUOUS
Status: CANCELLED | OUTPATIENT
Start: 2019-10-24

## 2019-10-28 ENCOUNTER — HOSPITAL ENCOUNTER (OUTPATIENT)
Dept: MRI IMAGING | Age: 39
Discharge: HOME | End: 2019-10-28
Attending: ORTHOPAEDIC SURGERY
Payer: COMMERCIAL

## 2019-10-28 DIAGNOSIS — M75.102 TEAR OF LEFT ROTATOR CUFF, UNSPECIFIED TEAR EXTENT, UNSPECIFIED WHETHER TRAUMATIC: ICD-10-CM

## 2019-10-28 PROCEDURE — 73221 MRI JOINT UPR EXTREM W/O DYE: CPT

## 2019-10-29 ENCOUNTER — ANESTHESIA EVENT (OUTPATIENT)
Dept: SURGERY | Age: 39
End: 2019-10-29
Payer: COMMERCIAL

## 2019-10-29 NOTE — H&P
History and Physical    The patient is a 72-year-old male  who comes in with difficulty urinating. This been going on for many years. The patient will have very slow hesitant urine and oftentimes feels that he is out. There are times when he strains so hard that there is what looks like semen in the urine.     Additionally, the patient does not have any fluid with ejaculation. He dates all of these issues back to when he underwent emergency colon resection for what sounds like rupture of diverticulitis abscess. The patient did not require a temporary diverting colostomy. However, the patient was catheterized for an extended period of time and reports an event that sounds as though the balloon was removed and completely deflated and was quite traumatic and painful     History reviewed. No pertinent past medical history. Patient Active Problem List   Diagnosis Code    Severe obesity (BMI 35.0-39. 9) E66.01    Gastroesophageal reflux disease without esophagitis K21.9    Seasonal allergic rhinitis J30.2      History reviewed. No pertinent surgical history. Current Outpatient Medications   Medication Sig Dispense Refill    melatonin 3 mg tablet Take  by mouth.        pantoprazole (PROTONIX) 40 mg tablet TAKE 1 TABLET BY MOUTH ONCE DAILY 90 Tab 1    buPROPion XL (WELLBUTRIN XL) 150 mg tablet Take 1 Tab by mouth every morning. 90 Tab 1    meloxicam (MOBIC) 15 mg tablet Take 1 Tab by mouth daily (with breakfast).  90 Tab 1    loratadine (CLARITIN) 10 mg tablet Take 10 mg by mouth.        clobetasol (TEMOVATE) 0.05 % external solution Use bid to affect area (scalp) for two weeks then decrease to twice a week for maintenance 50 mL 2      No Known Allergies  Social History            Socioeconomic History    Marital status:        Spouse name: Not on file    Number of children: Not on file    Years of education: Not on file    Highest education level: Not on file Occupational History    Not on file   Social Needs    Financial resource strain: Not on file    Food insecurity:       Worry: Not on file       Inability: Not on file    Transportation needs:       Medical: Not on file       Non-medical: Not on file   Tobacco Use    Smoking status: Never Smoker    Smokeless tobacco: Current User   Substance and Sexual Activity    Alcohol use: Yes    Drug use: No    Sexual activity: Yes   Lifestyle    Physical activity:       Days per week: Not on file       Minutes per session: Not on file    Stress: Not on file   Relationships    Social connections:       Talks on phone: Not on file       Gets together: Not on file       Attends Druze service: Not on file       Active member of club or organization: Not on file       Attends meetings of clubs or organizations: Not on file       Relationship status: Not on file    Intimate partner violence:       Fear of current or ex partner: Not on file       Emotionally abused: Not on file       Physically abused: Not on file       Forced sexual activity: Not on file   Other Topics Concern    Not on file   Social History Narrative    Not on file            Family History   Problem Relation Age of Onset    Cancer Maternal Grandfather                          Visit Vitals  /78 (BP 1 Location: Left arm, BP Patient Position: Sitting)   Pulse 64   Ht 5' 6\" (1.676 m)   Wt 246 lb (111.6 kg)   SpO2 98%   BMI 39.71 kg/m²      Physical          Gen: WDWN adult NAD  Head  : normocephalic,  Normal ROM; eyes with normal pupils, EOMs, no masses;  conjunctiva normal  Neck: normal movement,  no evident mass,  No evident adenopathy, trachea midline,  Lungs: no respiratory distress or difficulties  CV:  No evident peripheral swelling  Abd :bowel sounds normal, no masses, tenderness, organomegaly  Flanks     -penis normal.  Scrotal contents reveal normal size and texture of testes with normal epididymis and vasa. No hernia present. Rectal tone reveals normal tone and prostate of perhaps 15 g volume and it is normal     Extremities- no edema, arthritis, deformity, swelling  Psych- oriented, no evident anxiety, no cognitive impairment evident        Urinalysis is negative although the patient states that there have been times when he is provided urine specimens and have shown blood  Post void bladder scan 34 cc                 Impression/ PLAN  The dry ejaculation and difficulty voiding following what sounds like a traumatic catheterization event     Plan:  Fully discussed with the patient. The patient is at high risk for stricture disease somewhere along the line and most likely also has some type of bladder neck contracture or dysfunction. The patient needs to be scheduled for anesthesia cystoscopy preceded by retrograde urethrogram.  The patient is aware of the potential for direct vision optical internal urethrotomy and the possibility of needing to resect the bladder neck contracture. The patient is aware of the scar tissue involving the membranous urethra cannot be cut. The patient is aware of the risk of recurrence and the fact that a bladder neck contracture, completely resected, it will not reverse his retrograde ejaculation. The patient states that he has one child and they are not interested at this age and having any further children     The nature of the procedure and the risks including possible prolonged catheterization and the possibility for recurrence is reviewed with the patient              History and Physical    The patient is a 42-year-old male  who comes in with difficulty urinating. This been going on for many years. The patient will have very slow hesitant urine and oftentimes feels that he is out. There are times when he strains so hard that there is what looks like semen in the urine.     Additionally, the patient does not have any fluid with ejaculation.   He dates all of these issues back to when he underwent emergency colon resection for what sounds like rupture of diverticulitis abscess. The patient did not require a temporary diverting colostomy. However, the patient was catheterized for an extended period of time and reports an event that sounds as though the balloon was removed and completely deflated and was quite traumatic and painful     History reviewed. No pertinent past medical history. Patient Active Problem List   Diagnosis Code    Severe obesity (BMI 35.0-39. 9) E66.01    Gastroesophageal reflux disease without esophagitis K21.9    Seasonal allergic rhinitis J30.2      History reviewed. No pertinent surgical history. Current Outpatient Medications   Medication Sig Dispense Refill    melatonin 3 mg tablet Take  by mouth.        pantoprazole (PROTONIX) 40 mg tablet TAKE 1 TABLET BY MOUTH ONCE DAILY 90 Tab 1    buPROPion XL (WELLBUTRIN XL) 150 mg tablet Take 1 Tab by mouth every morning. 90 Tab 1    meloxicam (MOBIC) 15 mg tablet Take 1 Tab by mouth daily (with breakfast). 90 Tab 1    loratadine (CLARITIN) 10 mg tablet Take 10 mg by mouth.        clobetasol (TEMOVATE) 0.05 % external solution Use bid to affect area (scalp) for two weeks then decrease to twice a week for maintenance 50 mL 2      No Known Allergies  Social History            Socioeconomic History    Marital status:        Spouse name: Not on file    Number of children: Not on file    Years of education: Not on file    Highest education level: Not on file   Occupational History    Not on file   Social Needs    Financial resource strain: Not on file    Food insecurity:       Worry: Not on file       Inability: Not on file    Transportation needs:       Medical: Not on file       Non-medical: Not on file   Tobacco Use    Smoking status: Never Smoker    Smokeless tobacco: Current User   Substance and Sexual Activity    Alcohol use:  Yes    Drug use: No    Sexual activity: Yes   Lifestyle    Physical activity:       Days per week: Not on file       Minutes per session: Not on file    Stress: Not on file   Relationships    Social connections:       Talks on phone: Not on file       Gets together: Not on file       Attends Hoahaoism service: Not on file       Active member of club or organization: Not on file       Attends meetings of clubs or organizations: Not on file       Relationship status: Not on file    Intimate partner violence:       Fear of current or ex partner: Not on file       Emotionally abused: Not on file       Physically abused: Not on file       Forced sexual activity: Not on file   Other Topics Concern    Not on file   Social History Narrative    Not on file      Family History   Problem Relation Age of Onset    Cancer Maternal Grandfather                          Visit Vitals  /78 (BP 1 Location: Left arm, BP Patient Position: Sitting)   Pulse 64   Ht 5' 6\" (1.676 m)   Wt 246 lb (111.6 kg)   SpO2 98%   BMI 39.71 kg/m²      Physical          Gen: WDWN adult NAD  Head  : normocephalic,  Normal ROM; eyes with normal pupils, EOMs, no masses;  conjunctiva normal  Neck: normal movement,  no evident mass,  No evident adenopathy, trachea midline,  Lungs: no respiratory distress or difficulties  CV:  No evident peripheral swelling  Abd :bowel sounds normal, no masses, tenderness, organomegaly  Flanks     -penis normal.  Scrotal contents reveal normal size and texture of testes with normal epididymis and vasa. No hernia present.   Rectal tone reveals normal tone and prostate of perhaps 15 g volume and it is normal     Extremities- no edema, arthritis, deformity, swelling  Psych- oriented, no evident anxiety, no cognitive impairment evident        Urinalysis is negative although the patient states that there have been times when he is provided urine specimens and have shown blood  Post void bladder scan 34 cc                 Impression/ PLAN  The dry ejaculation and difficulty voiding following what sounds like a traumatic catheterization event     Plan:  Fully discussed with the patient. The patient is at high risk for stricture disease somewhere along the line and most likely also has some type of bladder neck contracture or dysfunction. The patient needs to be scheduled for anesthesia cystoscopy preceded by retrograde urethrogram.  The patient is aware of the potential for direct vision optical internal urethrotomy and the possibility of needing to resect the bladder neck contracture. The patient is aware of the scar tissue involving the membranous urethra cannot be cut. The patient is aware of the risk of recurrence and the fact that a bladder neck contracture, completely resected, it will not reverse his retrograde ejaculation.   The patient states that he has one child and they are not interested at this age and having any further children     The nature of the procedure and the risks including possible prolonged catheterization and the possibility for recurrence is reviewed with the patient

## 2019-10-30 ENCOUNTER — HOSPITAL ENCOUNTER (OUTPATIENT)
Age: 39
Setting detail: OUTPATIENT SURGERY
Discharge: HOME | End: 2019-10-30
Attending: UROLOGY | Admitting: UROLOGY
Payer: COMMERCIAL

## 2019-10-30 ENCOUNTER — ANESTHESIA (OUTPATIENT)
Dept: SURGERY | Age: 39
End: 2019-10-30
Payer: COMMERCIAL

## 2019-10-30 ENCOUNTER — APPOINTMENT (OUTPATIENT)
Dept: GENERAL RADIOLOGY | Age: 39
End: 2019-10-30
Attending: UROLOGY
Payer: COMMERCIAL

## 2019-10-30 VITALS
DIASTOLIC BLOOD PRESSURE: 77 MMHG | WEIGHT: 249 LBS | BODY MASS INDEX: 40.02 KG/M2 | RESPIRATION RATE: 16 BRPM | HEIGHT: 66 IN | OXYGEN SATURATION: 99 % | TEMPERATURE: 97.6 F | HEART RATE: 60 BPM | SYSTOLIC BLOOD PRESSURE: 110 MMHG

## 2019-10-30 PROCEDURE — 77030040361 HC SLV COMPR DVT MDII -B: Performed by: UROLOGY

## 2019-10-30 PROCEDURE — 76010000154 HC OR TIME FIRST 0.5 HR: Performed by: UROLOGY

## 2019-10-30 PROCEDURE — 74011000250 HC RX REV CODE- 250: Performed by: NURSE ANESTHETIST, CERTIFIED REGISTERED

## 2019-10-30 PROCEDURE — 77030012510 HC MSK AIRWY LMA TELE -B: Performed by: ANESTHESIOLOGY

## 2019-10-30 PROCEDURE — 77030020015 HC EVAC BLDR UROVAC BSC -B: Performed by: UROLOGY

## 2019-10-30 PROCEDURE — 77030011265 HC ELECTRD BLD HEX COVD -A: Performed by: UROLOGY

## 2019-10-30 PROCEDURE — 74450 X-RAY URETHRA/BLADDER: CPT

## 2019-10-30 PROCEDURE — 76060000031 HC ANESTHESIA FIRST 0.5 HR: Performed by: UROLOGY

## 2019-10-30 PROCEDURE — 77030010539 HC BG LEG URIN BARD -A: Performed by: UROLOGY

## 2019-10-30 PROCEDURE — 74011250636 HC RX REV CODE- 250/636: Performed by: NURSE ANESTHETIST, CERTIFIED REGISTERED

## 2019-10-30 PROCEDURE — 77030007307 HC ELECTRD URTHRTM STOR -C: Performed by: UROLOGY

## 2019-10-30 PROCEDURE — 77030040922 HC BLNKT HYPOTHRM STRY -A: Performed by: UROLOGY

## 2019-10-30 PROCEDURE — 76210000006 HC OR PH I REC 0.5 TO 1 HR: Performed by: UROLOGY

## 2019-10-30 PROCEDURE — 74011250636 HC RX REV CODE- 250/636: Performed by: UROLOGY

## 2019-10-30 PROCEDURE — 77030012961 HC IRR KT CYSTO/TUR ICUM -A: Performed by: UROLOGY

## 2019-10-30 PROCEDURE — 77030018831 HC SOL IRR H20 BAXT -A: Performed by: UROLOGY

## 2019-10-30 PROCEDURE — 76210000021 HC REC RM PH II 0.5 TO 1 HR: Performed by: UROLOGY

## 2019-10-30 PROCEDURE — 74011636320 HC RX REV CODE- 636/320: Performed by: UROLOGY

## 2019-10-30 PROCEDURE — C1769 GUIDE WIRE: HCPCS | Performed by: UROLOGY

## 2019-10-30 PROCEDURE — 77030018836 HC SOL IRR NACL ICUM -A: Performed by: UROLOGY

## 2019-10-30 PROCEDURE — 77030019927 HC TBNG IRR CYSTO BAXT -A: Performed by: UROLOGY

## 2019-10-30 PROCEDURE — C1758 CATHETER, URETERAL: HCPCS | Performed by: UROLOGY

## 2019-10-30 PROCEDURE — 77030018842 HC SOL IRR SOD CL 9% BAXT -A: Performed by: UROLOGY

## 2019-10-30 RX ORDER — HYDROMORPHONE HYDROCHLORIDE 1 MG/ML
0.5 INJECTION, SOLUTION INTRAMUSCULAR; INTRAVENOUS; SUBCUTANEOUS
Status: DISCONTINUED | OUTPATIENT
Start: 2019-10-30 | End: 2019-10-30 | Stop reason: HOSPADM

## 2019-10-30 RX ORDER — PROPOFOL 10 MG/ML
INJECTION, EMULSION INTRAVENOUS AS NEEDED
Status: DISCONTINUED | OUTPATIENT
Start: 2019-10-30 | End: 2019-10-30 | Stop reason: HOSPADM

## 2019-10-30 RX ORDER — LIDOCAINE HYDROCHLORIDE 20 MG/ML
INJECTION, SOLUTION EPIDURAL; INFILTRATION; INTRACAUDAL; PERINEURAL AS NEEDED
Status: DISCONTINUED | OUTPATIENT
Start: 2019-10-30 | End: 2019-10-30 | Stop reason: HOSPADM

## 2019-10-30 RX ORDER — SODIUM CHLORIDE 0.9 % (FLUSH) 0.9 %
5-40 SYRINGE (ML) INJECTION EVERY 8 HOURS
Status: DISCONTINUED | OUTPATIENT
Start: 2019-10-30 | End: 2019-10-30 | Stop reason: HOSPADM

## 2019-10-30 RX ORDER — FENTANYL CITRATE 50 UG/ML
50 INJECTION, SOLUTION INTRAMUSCULAR; INTRAVENOUS AS NEEDED
Status: DISCONTINUED | OUTPATIENT
Start: 2019-10-30 | End: 2019-10-30 | Stop reason: HOSPADM

## 2019-10-30 RX ORDER — SODIUM CHLORIDE, SODIUM LACTATE, POTASSIUM CHLORIDE, CALCIUM CHLORIDE 600; 310; 30; 20 MG/100ML; MG/100ML; MG/100ML; MG/100ML
75 INJECTION, SOLUTION INTRAVENOUS CONTINUOUS
Status: DISCONTINUED | OUTPATIENT
Start: 2019-10-30 | End: 2019-10-30 | Stop reason: HOSPADM

## 2019-10-30 RX ORDER — INSULIN LISPRO 100 [IU]/ML
INJECTION, SOLUTION INTRAVENOUS; SUBCUTANEOUS ONCE
Status: DISCONTINUED | OUTPATIENT
Start: 2019-10-30 | End: 2019-10-30 | Stop reason: HOSPADM

## 2019-10-30 RX ORDER — SODIUM CHLORIDE, SODIUM LACTATE, POTASSIUM CHLORIDE, CALCIUM CHLORIDE 600; 310; 30; 20 MG/100ML; MG/100ML; MG/100ML; MG/100ML
50 INJECTION, SOLUTION INTRAVENOUS CONTINUOUS
Status: DISCONTINUED | OUTPATIENT
Start: 2019-10-30 | End: 2019-10-30 | Stop reason: HOSPADM

## 2019-10-30 RX ORDER — SODIUM CHLORIDE 0.9 % (FLUSH) 0.9 %
5-40 SYRINGE (ML) INJECTION AS NEEDED
Status: DISCONTINUED | OUTPATIENT
Start: 2019-10-30 | End: 2019-10-30 | Stop reason: HOSPADM

## 2019-10-30 RX ORDER — ONDANSETRON 2 MG/ML
4 INJECTION INTRAMUSCULAR; INTRAVENOUS ONCE
Status: DISCONTINUED | OUTPATIENT
Start: 2019-10-30 | End: 2019-10-30 | Stop reason: HOSPADM

## 2019-10-30 RX ORDER — CIPROFLOXACIN 2 MG/ML
400 INJECTION, SOLUTION INTRAVENOUS ONCE
Status: COMPLETED | OUTPATIENT
Start: 2019-10-30 | End: 2019-10-30

## 2019-10-30 RX ORDER — MIDAZOLAM HYDROCHLORIDE 1 MG/ML
INJECTION, SOLUTION INTRAMUSCULAR; INTRAVENOUS AS NEEDED
Status: DISCONTINUED | OUTPATIENT
Start: 2019-10-30 | End: 2019-10-30 | Stop reason: HOSPADM

## 2019-10-30 RX ORDER — LIDOCAINE HYDROCHLORIDE 10 MG/ML
0.1 INJECTION, SOLUTION EPIDURAL; INFILTRATION; INTRACAUDAL; PERINEURAL AS NEEDED
Status: DISCONTINUED | OUTPATIENT
Start: 2019-10-30 | End: 2019-10-30 | Stop reason: HOSPADM

## 2019-10-30 RX ORDER — SODIUM CHLORIDE 9 MG/ML
125 INJECTION, SOLUTION INTRAVENOUS CONTINUOUS
Status: DISCONTINUED | OUTPATIENT
Start: 2019-10-30 | End: 2019-10-30 | Stop reason: HOSPADM

## 2019-10-30 RX ORDER — ONDANSETRON 2 MG/ML
INJECTION INTRAMUSCULAR; INTRAVENOUS AS NEEDED
Status: DISCONTINUED | OUTPATIENT
Start: 2019-10-30 | End: 2019-10-30 | Stop reason: HOSPADM

## 2019-10-30 RX ORDER — DEXAMETHASONE SODIUM PHOSPHATE 4 MG/ML
INJECTION, SOLUTION INTRA-ARTICULAR; INTRALESIONAL; INTRAMUSCULAR; INTRAVENOUS; SOFT TISSUE AS NEEDED
Status: DISCONTINUED | OUTPATIENT
Start: 2019-10-30 | End: 2019-10-30 | Stop reason: HOSPADM

## 2019-10-30 RX ORDER — FENTANYL CITRATE 50 UG/ML
INJECTION, SOLUTION INTRAMUSCULAR; INTRAVENOUS AS NEEDED
Status: DISCONTINUED | OUTPATIENT
Start: 2019-10-30 | End: 2019-10-30 | Stop reason: HOSPADM

## 2019-10-30 RX ADMIN — MIDAZOLAM HYDROCHLORIDE 2 MG: 2 INJECTION, SOLUTION INTRAMUSCULAR; INTRAVENOUS at 10:02

## 2019-10-30 RX ADMIN — FENTANYL CITRATE 50 MCG: 50 INJECTION INTRAMUSCULAR; INTRAVENOUS at 10:21

## 2019-10-30 RX ADMIN — LIDOCAINE HYDROCHLORIDE 60 MG: 20 INJECTION, SOLUTION EPIDURAL; INFILTRATION; INTRACAUDAL; PERINEURAL at 10:07

## 2019-10-30 RX ADMIN — SODIUM CHLORIDE, SODIUM LACTATE, POTASSIUM CHLORIDE, AND CALCIUM CHLORIDE 75 ML/HR: 600; 310; 30; 20 INJECTION, SOLUTION INTRAVENOUS at 08:32

## 2019-10-30 RX ADMIN — PROPOFOL 200 MG: 10 INJECTION, EMULSION INTRAVENOUS at 10:07

## 2019-10-30 RX ADMIN — FAMOTIDINE: 10 INJECTION INTRAVENOUS at 08:33

## 2019-10-30 RX ADMIN — DEXAMETHASONE SODIUM PHOSPHATE 4 MG: 4 INJECTION, SOLUTION INTRAMUSCULAR; INTRAVENOUS at 10:11

## 2019-10-30 RX ADMIN — CIPROFLOXACIN 400 MG: 2 INJECTION, SOLUTION INTRAVENOUS at 10:09

## 2019-10-30 RX ADMIN — ONDANSETRON 4 MG: 2 INJECTION INTRAMUSCULAR; INTRAVENOUS at 10:12

## 2019-10-30 RX ADMIN — FENTANYL CITRATE 50 MCG: 50 INJECTION INTRAMUSCULAR; INTRAVENOUS at 10:10

## 2019-10-30 NOTE — OP NOTES
[address] [place] [address]   OPERATIVE REPORT    Name:  Alex Wright  MR#:   [id number]  :  1980  ACCOUNT #:  [de-identified]  DATE OF SERVICE:  10/30/2019    PREOPERATIVE DIAGNOSIS:  Rule out stricture. POSTOPERATIVE DIAGNOSIS:  Mild membranous urethral stricture. PROCEDURE PERFORMED:  Retrograde urethrogram, cystoscopy and urethral dilation. SURGEON:  Salvador Huston MD    ASSISTANT:  None. ANESTHESIA:  General.    COMPLICATIONS:  none  SPECIMENS REMOVED:  None. IMPLANTS:  None. ESTIMATED BLOOD LOSS:  None. INDICATIONS:  A 30-year-old male  with dysfunctional urinating and reduced or dry ejaculate. The patient does have a distant history of catheter trauma and the patient comes now for anesthesia cystoscopy. The patient has been preopped and is aware of the preparation technique, convalescence, and risks. He is aware of the hazards that include, but are not limited to infection, bleeding, failure, recurrence of any stricture that might be present and the possibility of disruption of an already compromised ejaculatory function. The patient is aware of the possible need for catheter to be in place upon awakening. He wishes to proceed giving his informed consent. FINDINGS:  Membranous urethral stricture. PROCEDURE:  The patient is anesthetized via the general LMA route. Time-out was accomplished and verified. The patient is prepped and draped in sterile fashion in a dorsal lithotomy position. A retrograde urethrogram is accomplished with an introducer nozzle and this discloses essentially a normal pendulous and bulbar urethra. There is a predictable degree of constriction indicating the membranous urethra. There is no significant prostatic urethral dilation and the contrast enters into the bladder without a jet effect. The urethrogram syringe is removed and the contrast was allowed to evacuate.   Upon this, a 21-Citizen [place] rigid cystourethroscope is inserted and retrograde evaluation carried out disclosing a normal pendulous and bulbar urethra. There is a circumferential constriction of scar tissue at the membranous urethra, but it is essentially very short and virtually an iris diaphragm involving the membranous urethra itself. The scope passes through it with only modest resistance and the prostatic fossa appears to be normal without dilation. The bladder neck is open and flexible and as I rocked the scope in all quadrants, there does not appear to be any scar tissue that is restricting it. The trigone was well developed with ureteral orifices bilaterally of normal size, position and contour. The remainder of the bladder epithelium is carefully examined and appears to be normal without trabeculation. The bladder is decompressed. The scope is removed and the membranous urethra is dilated with Yamilka  sounds up to 28-Greenlandic. The procedure was terminated. The patient tolerated the procedure well.       MD JOSSY [email or web address]  D:  10/30/2019 10:38  T:  10/30/2019 11:35  JOB #:  [id number]

## 2019-10-30 NOTE — BRIEF OP NOTE
BRIEF OPERATIVE NOTE    Date of Procedure: 10/30/2019   Preoperative Diagnosis: N35.8 URETHRAL STRICTURE  Postoperative Diagnosis: N35.8 URETHRAL STRICTURE    Procedure(s):  CYSTOSCOPY WITH RETROGRADE urethrogram / urethral dilation  Surgeon(s) and Role:     * Cristin Chaidez MD - Primary         Surgical Assistant: none    Surgical Staff:  Circ-1: Wilfredo Neal  Circ-2: Kalli Rodriguez  Radiology Technician: Kristin Florence  Scrub Tech-1: Lue Spatz  Event Time In Time Out   Incision Start 1015    Incision Close 1021      Anesthesia: General   Estimated Blood Loss: none  Specimens: * No specimens in log *   Findings: modest membranous urethral stricture  Complications: none  Implants: * No implants in log *

## 2019-10-30 NOTE — ANESTHESIA POSTPROCEDURE EVALUATION
Procedure(s):  CYSTOSCOPY WITH RETROGRADES/DIRECT VISION INTERNAL URETHROTOMY/DILATION. general    Anesthesia Post Evaluation      Multimodal analgesia: multimodal analgesia used between 6 hours prior to anesthesia start to PACU discharge  Patient location during evaluation: bedside  Patient participation: complete - patient participated  Level of consciousness: awake  Pain management: adequate  Airway patency: patent  Anesthetic complications: no  Cardiovascular status: stable  Respiratory status: acceptable  Hydration status: acceptable  Post anesthesia nausea and vomiting:  controlled      Vitals Value Taken Time   /77 10/30/2019 11:00 AM   Temp     Pulse 63 10/30/2019 11:02 AM   Resp 16 10/30/2019 11:02 AM   SpO2 99 % 10/30/2019 11:02 AM   Vitals shown include unvalidated device data.

## 2019-10-30 NOTE — DISCHARGE INSTRUCTIONS
Urethral Dilation: What to Expect at [facility]    Urethral dilation is a procedure to stretch the sides of the urethra. The urethra is the tube that carries urine from your bladder to outside your body. Sometimes scar tissue narrows the urethra. This is called urethral stricture. When the urethra is narrow, it's hard for urine to pass through and out of your body. Dilation often can relieve symptoms by widening the urethra. To open the narrowed part, the doctor used one or more thin tools to stretch the stricture. He or she then may have placed a thin, soft tube (catheter) in the urethra to drain urine and to keep the urethra open. The catheter is removed after a few days. After dilation, urine should be able to pass more freely from your bladder. Your doctor may have shown you how to do dilation at home. After dilation, your urethra may be sore at first. It may burn when you urinate. You may feel the need to urinate more often, and you may have some blood in your urine. These symptoms should get better in 1 or 2 days. You will probably be able to go back to most of your usual activities in 1 or 2 days. Drink extra water for the next few days. This care sheet gives you a general idea about how long it will take for you to recover. But each person recovers at a different pace. Follow the steps below to get better as quickly as possible. How can you care for yourself at home? Activity    · Rest when you feel tired.     · Be active. Walking is a good choice.     · Ask your doctor when you can drive again.     · You will probably need to take 1 to 2 days off from work. It depends on the type of work you do and how you feel.     · Ask your doctor when it is okay for you to have sex. Diet    · You can eat your normal diet.  If your stomach is upset, try bland, low-fat foods like plain rice, broiled chicken, toast, and yogurt.     · If your bowel movements are not regular right after dilation, try to avoid constipation and straining. Drink plenty of water. Your doctor may suggest fiber, a stool softener, or a mild laxative. Medicines    · Be safe with medicines. Take medicines exactly as prescribed. Call your doctor if you are having a problem with your medicine. You will get more details on the specific medicines your doctor prescribes.     · If you take aspirin or some other blood thinner, be sure to talk to your doctor. He or she will tell you if and when to start taking this medicine again. Make sure that you understand exactly what your doctor wants you to do. Follow-up care is a key part of your treatment and safety. Be sure to make and go to all appointments, and call your doctor if you are having problems. It's also a good idea to know your test results and keep a list of the medicines you take. When should you call for help? Call 911 anytime you think you may need emergency care. For example, call if:    · You passed out (lost consciousness).     · You have chest pain, are short of breath, or cough up blood.    Call the doctor now or seek immediate medical care if:    · You have pain that does not get better after you take pain medicine.     · You cannot pass urine.     · You pass blood clots in your urine.     · You are too sick to your stomach to drink any fluids.     · You have symptoms of a urinary tract infection. These may include:  ? Still having pain or burning when you urinate about 2 days after the procedure. ? Pain in the flank, which is just below the rib cage and above the waist on either side of the back. ? Still having blood in your urine about 2 days after the procedure. Where can you learn more? Go to http://gabriella-juany.info/. Enter U130 in the search box to learn more about \"Urethral Dilation: What to Expect at Home. \"  Current as of: December 19, 2018  Content Version: 12.2  © 6923-1469 [employer].  Care instructions adapted under license by [employer] (which disclaims liability or warranty for this information). If you have questions about a medical condition or this instruction, always ask your healthcare professional. [address] any warranty or liability for your use of this information. Cystoscopy: What to Expect at [facility]    A cystoscopy is a procedure that lets a doctor look inside of the bladder and the urethra. The urethra is the tube that carries urine from the bladder to outside the body. The doctor uses a thin, lighted tool called a cystoscope. Your bladder is filled with fluid. This stretches the bladder so that your doctor can look closely at the inside of your bladder. After the cystoscopy, your urethra may be sore at first, and it may burn when you urinate for the first few days after the procedure. You may feel the need to urinate more often, and your urine may be pink. These symptoms should get better in 1 or 2 days. You will probably be able to go back to most of your usual activities in 1 or 2 days. This care sheet gives you a general idea about how long it will take for you to recover. But each person recovers at a different pace. Follow the steps below to get better as quickly as possible. How can you care for yourself at home? Activity    · Rest when you feel tired. Getting enough sleep will help you recover.     · Try to walk each day. Start by walking a little more than you did the day before. Bit by bit, increase the amount you walk.  Walking boosts blood flow and helps prevent pneumonia and constipation.     · Avoid strenuous activities, such as bicycle riding, jogging, weight lifting, or aerobic exercise, until your doctor says it is okay.     · Ask your doctor when you can drive again.     · Most people are able to return to work within 1 or 2 days after the procedure.     · You may shower and take baths as usual.     · Ask your doctor when it is okay for you to have sex. Diet    · You can eat your normal diet. If your stomach is upset, try bland, low-fat foods like plain rice, broiled chicken, toast, and yogurt.     · Drink plenty of fluids (unless your doctor tells you not to). Medicines    · Take pain medicines exactly as directed. ? If the doctor gave you a prescription medicine for pain, take it as prescribed. ? If you are not taking a prescription pain medicine, ask your doctor if you can take an over-the-counter medicine.     · If you think your pain medicine is making you sick to your stomach:  ? Take your medicine after meals (unless your doctor has told you not to). ? Ask your doctor for a different pain medicine.     · If your doctor prescribed antibiotics, take them as directed. Do not stop taking them just because you feel better. You need to take the full course of antibiotics. Follow-up care is a key part of your treatment and safety. Be sure to make and go to all appointments, and call your doctor if you are having problems. It's also a good idea to know your test results and keep a list of the medicines you take. When should you call for help? Call 911 anytime you think you may need emergency care. For example, call if:    · You passed out (lost consciousness).     · You have severe trouble breathing.     · You have sudden chest pain and shortness of breath, or you cough up blood.     · You have severe belly pain.    Call your doctor now or seek immediate medical care if:    · You are sick to your stomach or cannot keep fluids down.     · Your urine is still red or you see blood clots after you have urinated several times.     · You have trouble passing urine or stool, especially if you have pain or swelling in your lower belly.     · You have signs of a blood clot, such as:  ? Pain in your calf, back of the knee, thigh, or groin. ?  Redness and swelling in your leg or groin.     · You develop a fever or severe chills.     · You have pain in your back just below your rib cage. This is called flank pain.    Watch closely for changes in your health, and be sure to contact your doctor if:    · You have pain or burning when you urinate. A burning feeling is normal for a day or two after the test, but call if it does not get better.     · You have a frequent urge to urinate but can pass only small amounts of urine.     · Your urine is pink, red, or cloudy, or smells bad. It is normal for the urine to have a pinkish color for a few days after the test, but call if it does not get better. Where can you learn more? Go to http://gabriella-juany.info/. Enter Z182 in the search box to learn more about \"Cystoscopy: What to Expect at Home. \"  Current as of: December 19, 2018  Content Version: 12.2  © 5061-1674 [employer]. Care instructions adapted under license by [employer] (which disclaims liability or warranty for this information). If you have questions about a medical condition or this instruction, always ask your healthcare professional. [address] any warranty or liability for your use of this information. DISCHARGE SUMMARY from Nurse    PATIENT INSTRUCTIONS:    After general anesthesia or intravenous sedation, for 24 hours or while taking prescription Narcotics:  · Limit your activities  · Do not drive and operate hazardous machinery  · Do not make important personal or business decisions  · Do  not drink alcoholic beverages  · If you have not urinated within 8 hours after discharge, please contact your surgeon on call.     Report the following to your surgeon:  · Excessive pain, swelling, redness or odor of or around the surgical area  · Temperature over 100.5  · Nausea and vomiting lasting longer than 4 hours or if unable to take medications  · Any signs of decreased circulation or nerve impairment to extremity: change in color, persistent  numbness, tingling, coldness or increase pain  · Any questions    What to do at Home:  Recommended activity: Activity as tolerated and no driving for today. *  Please give a list of your current medications to your Primary Care Provider. *  Please update this list whenever your medications are discontinued, doses are      changed, or new medications (including over-the-counter products) are added. *  Please carry medication information at all times in case of emergency situations. These are general instructions for a healthy lifestyle:    No smoking/ No tobacco products/ Avoid exposure to second hand smoke  Surgeon General's Warning:  Quitting smoking now greatly reduces serious risk to your health. Obesity, smoking, and sedentary lifestyle greatly increases your risk for illness    A healthy diet, regular physical exercise & weight monitoring are important for maintaining a healthy lifestyle    You may be retaining fluid if you have a history of heart failure or if you experience any of the following symptoms:  Weight gain of 3 pounds or more overnight or 5 pounds in a week, increased swelling in our hands or feet or shortness of breath while lying flat in bed. Please call your doctor as soon as you notice any of these symptoms; do not wait until your next office visit. The discharge information has been reviewed with the patient and wife. The patient and wife verbalized understanding. Discharge medications reviewed with the patient and wife and appropriate educational materials and side effects teaching were provided.   ___________________________________________________________________________________________________________________________________

## 2019-10-30 NOTE — ANESTHESIA PREPROCEDURE EVALUATION
Relevant Problems   No relevant active problems       Anesthetic History   No history of anesthetic complications            Review of Systems / Medical History  Patient summary reviewed and pertinent labs reviewed    Pulmonary  Within defined limits                 Neuro/Psych         Psychiatric history (Depression)     Cardiovascular                  Exercise tolerance: >4 METS     GI/Hepatic/Renal     GERD: well controlled           Endo/Other        Morbid obesity     Other Findings              Physical Exam    Airway  Mallampati: II  TM Distance: 4 - 6 cm  Neck ROM: normal range of motion   Mouth opening: Normal     Cardiovascular  Regular rate and rhythm,  S1 and S2 normal,  no murmur, click, rub, or gallop             Dental      Comments: Right upper incisor broken   Pulmonary  Breath sounds clear to auscultation               Abdominal  GI exam deferred       Other Findings            Anesthetic Plan    ASA: 1  Anesthesia type: general          Induction: Intravenous  Anesthetic plan and risks discussed with: Patient

## 2019-11-06 ENCOUNTER — OFFICE VISIT (OUTPATIENT)
Dept: ORTHOPEDIC SURGERY | Age: 39
End: 2019-11-06

## 2019-11-06 VITALS
OXYGEN SATURATION: 99 % | TEMPERATURE: 98.3 F | RESPIRATION RATE: 14 BRPM | BODY MASS INDEX: 40.02 KG/M2 | SYSTOLIC BLOOD PRESSURE: 128 MMHG | DIASTOLIC BLOOD PRESSURE: 84 MMHG | WEIGHT: 249 LBS | HEIGHT: 66 IN | HEART RATE: 68 BPM

## 2019-11-06 DIAGNOSIS — S43.432A TEAR OF LEFT GLENOID LABRUM, INITIAL ENCOUNTER: Primary | ICD-10-CM

## 2019-11-06 NOTE — PROGRESS NOTES
Tj Saxena  1980   Chief Complaint   Patient presents with    Shoulder Pain     left shoulder pain        HISTORY OF PRESENT ILLNESS  Tj Saxena is a 42 y.o. male who presents today for reevaluation of left shoulder pain and MRI review. Patient rates pain as 1/10 today. Pain has been present for a couple of months. The patient has had a cortisone injection which provided relief but the pain has returned. He has some limited ROM due to the pain. Surgery was discussed with the patient today but they do not feel the pain is enough to move forward with surgery at this time. Patient denies any fever, chills, chest pain, shortness of breath or calf pain. The remainder of the review of systems is negative. There are no new illness or injuries to report since last seen in the office. There are no changes to medications, allergies, family or social history. Pain Assessment  11/6/2019   Location of Pain Shoulder   Pain Location Comment -   Location Modifiers Left   Severity of Pain 1   Quality of Pain Aching   Duration of Pain A few hours   Frequency of Pain Intermittent   Aggravating Factors Stretching   Aggravating Factors Comment -   Limiting Behavior -   Relieving Factors Rest   Result of Injury No   Type of Injury -     PHYSICAL EXAM:   Visit Vitals  /84   Pulse 68   Temp 98.3 °F (36.8 °C) (Oral)   Resp 14   Ht 5' 6\" (1.676 m)   Wt 249 lb (112.9 kg)   SpO2 99%   BMI 40.19 kg/m²     The patient is a well-developed, well-nourished male   in no acute distress. The patient is alert and oriented times three. The patient is alert and oriented times three. Mood and affect are normal.  LYMPHATIC: lymph nodes are not enlarged and are within normal limits  SKIN: normal in color and non tender to palpation. There are no bruises or abrasions noted. NEUROLOGICAL: Motor sensory exam is within normal limits. Reflexes are equal bilaterally.  There is normal sensation to pinprick and light touch  MUSCULOSKELETAL:  Examination Left shoulder   Skin Intact   AC joint tenderness -   Biceps tenderness -   Forward flexion/Elevation    Active abduction    Glenohumeral abduction 90   External rotation ROM 80   Internal rotation ROM 60   Apprehension -   Irmas Relocation -   Jerk -   Load and Shift -   Obriens -   Speeds -   Impingement sign +   Supraspinatus/Empty Can -, 5/5   External Rotation Strength -, 5/5   Lift Off/Belly Press -, 5/5   Neurovascular Intact       IMAGING: MRI of left shoulder dated 10/28/19 was reviewed and read by Dr. Flor Gallegos:   IMPRESSION:  Anterior-inferior 7 x 4 x 7 mm cyst adjacent to the glenoid likely denoting  occult labral injury. Osseous edema and cystic change within the posterior superior glenoid with  presumable overlying cartilage and labral degeneration. Nonspecific vascular structure coursing along the spinal glenoid notch of  uncertain clinical significance. Please see report for additional findings and details. 3 view xray images of left shoulder on 9/11/2019 read and reviewed by CESILIA Scott, reveal: no acute abnormalities     IMPRESSION:      ICD-10-CM ICD-9-CM    1. Tear of left glenoid labrum, initial encounter S43.432A 840.8         PLAN:   1. Pt presents today with left shoulder pain due to an MRI-documented labral tear. Surgery was discussed with the patient today but they do not feel the pain is enough to move forward with surgery at this time. I advised him to modify his activities, limit overhead movements. He should continue to take Mobic as needed. He can return to discuss surgery if the pain worsens. Risk factors include: BMI >35  2. No ultrasound exam indicated today  3. No cortisone injection indicated today   4. No Physical/Occupational Therapy indicated today  5. No diagnostic test indicated today  6. No durable medical equipment indicated today  7. No referral indicated today   8. No medications indicated today:   9.  No Narcotic indicated today     RTC prn      Scribed by [address]) as dictated by Charley Paulino MD    I, Dr. Charley Paulino, confirm that all documentation is accurate.     Charley Paulino M.D.   [facility]

## 2019-11-06 NOTE — PROGRESS NOTES
1. Have you been to the ER, urgent care clinic since your last visit? Hospitalized since your last visit? No    2. Have you seen or consulted any other health care providers outside of the [address] [place] [address] since your last visit? Include any pap smears or colon screening.  No

## 2019-11-07 ENCOUNTER — OFFICE VISIT (OUTPATIENT)
Dept: UROLOGY | Age: 39
End: 2019-11-07

## 2019-11-07 VITALS
OXYGEN SATURATION: 97 % | DIASTOLIC BLOOD PRESSURE: 84 MMHG | HEIGHT: 66 IN | HEART RATE: 63 BPM | SYSTOLIC BLOOD PRESSURE: 124 MMHG | WEIGHT: 249 LBS | BODY MASS INDEX: 40.02 KG/M2

## 2019-11-07 DIAGNOSIS — N35.913 MEMBRANOUS URETHRAL STRICTURE: Primary | ICD-10-CM

## 2019-11-07 LAB
BILIRUB UR QL STRIP: NEGATIVE
GLUCOSE UR-MCNC: NEGATIVE MG/DL
KETONES P FAST UR STRIP-MCNC: NEGATIVE MG/DL
PH UR STRIP: 5.5 [PH] (ref 4.6–8)
PROT UR QL STRIP: NEGATIVE
SP GR UR STRIP: 1.01 (ref 1–1.03)
UA UROBILINOGEN AMB POC: NORMAL (ref 0.2–1)
URINALYSIS CLARITY POC: CLEAR
URINALYSIS COLOR POC: YELLOW
URINE BLOOD POC: NEGATIVE
URINE LEUKOCYTES POC: NEGATIVE
URINE NITRITES POC: NEGATIVE

## 2019-11-07 NOTE — LETTER
NOTIFICATION RETURN TO WORK / SCHOOL 
 
11/7/2019 11:43 AM 
 
[facility] [address] To Whom It May Concern: 
 
Krzysztof Mullins is currently under the care of [facility]. He will return to work/school on: November 9, 2019. If there are questions or concerns please have the patient contact our office. Sincerely, Leia Faria MD

## 2019-11-07 NOTE — PROGRESS NOTES
NARRATIVE:    The patient is a pleasant 29-year-old male  who is postop cystoscopy and dilation of a membranous urethral stricture on 30 October. The stricture was in sufficient proximity to the urogenital diaphragm that the optical urethrotomy was not deemed safe. The patient feels that he is voiding very well now and having no complaints                    IMPRESSION:  Postop dilation of membranous urethral stricture with good result      PLAN:  Hydraulic auto dilation discussed with the patient and he will utilize this. The patient is advised of the impending closure of this office and the need to follow-up with [facility] [place] should symptoms recur    This visit exceeded 15 minutes and greater than 50% was counseling. The patient expresses understanding of the treatment plan and wishes to proceed    This dictation used voice recognition software and there may be mistakes.     Bhavesh Diana MD

## 2019-11-07 NOTE — PATIENT INSTRUCTIONS
Urine Test: About This Test  What is it? A urine test checks the color, clarity (clear or cloudy), odor, concentration, and acidity (pH) of your urine. It also checks your levels of protein, sugar, blood cells, or other substances in your urine. This test is sometimes called a urinalysis. Why is this test done? A urine test may be done:  · To check for a disease or infection of the urinary tract. The urinary tract includes the kidneys, the tubes that carry urine from the kidneys to the bladder (ureters), and the bladder. It also includes the tube that carries urine from the bladder to outside the body (urethra). · To check the treatment of conditions such as diabetes, kidney stones, a urinary tract infection (UTI), high blood pressure, or some kidney or liver diseases. How can you prepare for the test?  · Before the test, don't eat foods that can change the color of your urine. Examples of these include blackberries, beets, and rhubarb. · Don't do heavy exercise before the test.  · Tell your doctor if you are menstruating or close to starting your period. Your doctor may want to wait to do the test.  · Tell your doctor about all the nonprescription and prescription medicines and herbs or other supplements you take. Some of these can affect the results of this test.  What happens during the test?  A urine test can be done in your doctor's office, clinic, or lab. Or you may be asked to collect a urine sample at home. Then you can take it to the office or lab for testing. Clean-catch midstream urine collection  · Wash your hands before you start. · If the cup you are given has a lid, remove it carefully. Set it down with the inner surface up. Don't touch the inside of the cup with your fingers. · Clean the area around your genitals. ? For men: Pull back the foreskin, if present. Clean the head of your penis with medicated towelettes or swabs. ?  For women: Spread open the genital folds of skin with one hand. Then use medicated towelettes or swabs in your other hand to clean the area where urine comes out (the urethra). Wipe the area from front to back. · Start urinating into the toilet or urinal. A woman should hold apart the genital folds of skin while she urinates. · After the urine has flowed for several seconds, place the cup into the urine stream. Collect about 2 ounces of urine without stopping your flow of urine. · Don't touch the rim of the cup to your genital area. Don't get toilet paper, pubic hair, stool (feces), menstrual blood, or anything else in the urine sample. · Finish urinating into the toilet or urinal.  · Carefully replace and tighten the lid on the cup, and then return it to the lab. If you are collecting the urine at home and can't get it to the lab in an hour, refrigerate it. Double-voided urine sample collection  This method collects the urine your body is making right now. · Urinate into the toilet or urinal. Don't collect any of this urine. · Drink a large glass of water, and wait about 30 to 40 minutes. · Then get a urine sample. Follow the instructions above for collecting a clean-catch urine sample. · Take the urine sample to the lab. If you are collecting the urine at home and can't get it to the lab in an hour, refrigerate it. Follow-up care is a key part of your treatment and safety. Be sure to make and go to all appointments, and call your doctor if you are having problems. It's also a good idea to keep a list of the medicines you take. Ask your doctor when you can expect to have your test results. Where can you learn more? Go to http://gabriella-juany.info/. Enter R266 in the search box to learn more about \"Urine Test: About This Test.\"  Current as of: March 28, 2019  Content Version: 12.2  © 4578-9418 [employer].  Care instructions adapted under license by [employer] (which disclaims liability or warranty for this information). If you have questions about a medical condition or this instruction, always ask your healthcare professional. [place] [address] any warranty or liability for your use of this information.

## 2020-12-28 NOTE — PROGRESS NOTES
Call would not go through. Response sent via patient portal. Rx sent for one month of 20 mg ziprasidone caps.      ROOM # 4    Robby Birmingham presents today for   Chief Complaint   Patient presents with    Follow-up    Sleep Study         Visit Vitals  /78 (BP 1 Location: Left arm, BP Patient Position: Sitting)   Pulse (!) 57   Temp 97.6 °F (36.4 °C) (Oral)   Resp 16   Ht 5' 6\" (1.676 m)   Wt 247 lb (112 kg)   SpO2 98%   BMI 39.87 kg/m²         Advance Directive:  1. Do you have an advance directive in place? Patient Reply: NO    2. If not, would you like material regarding how to put one in place? Patient Reply: NO    Coordination of Care:  1. Have you been to the ER, urgent care clinic since your last visit? Hospitalized since your last visit?  NO

## 2022-03-18 PROBLEM — J30.2 SEASONAL ALLERGIC RHINITIS: Status: ACTIVE | Noted: 2018-06-13

## 2022-03-20 PROBLEM — K21.9 GASTROESOPHAGEAL REFLUX DISEASE WITHOUT ESOPHAGITIS: Status: ACTIVE | Noted: 2018-06-13

## 2023-02-27 ENCOUNTER — OFFICE VISIT (OUTPATIENT)
Age: 43
End: 2023-02-27
Payer: COMMERCIAL

## 2023-02-27 VITALS
WEIGHT: 235.2 LBS | SYSTOLIC BLOOD PRESSURE: 111 MMHG | RESPIRATION RATE: 16 BRPM | TEMPERATURE: 98 F | OXYGEN SATURATION: 99 % | HEIGHT: 66 IN | BODY MASS INDEX: 37.8 KG/M2 | HEART RATE: 57 BPM | DIASTOLIC BLOOD PRESSURE: 70 MMHG

## 2023-02-27 DIAGNOSIS — Z87.898 H/O DIZZINESS: ICD-10-CM

## 2023-02-27 DIAGNOSIS — Z11.4 SCREENING FOR HIV WITHOUT PRESENCE OF RISK FACTORS: ICD-10-CM

## 2023-02-27 DIAGNOSIS — Z00.00 WELL ADULT EXAM: Primary | ICD-10-CM

## 2023-02-27 DIAGNOSIS — E66.09 CLASS 2 OBESITY DUE TO EXCESS CALORIES WITH BODY MASS INDEX (BMI) OF 37.0 TO 37.9 IN ADULT, UNSPECIFIED WHETHER SERIOUS COMORBIDITY PRESENT: ICD-10-CM

## 2023-02-27 DIAGNOSIS — Z13.220 SCREENING FOR HYPERLIPIDEMIA: ICD-10-CM

## 2023-02-27 DIAGNOSIS — Z13.1 ENCOUNTER FOR SCREENING FOR DIABETES MELLITUS: ICD-10-CM

## 2023-02-27 DIAGNOSIS — Z11.59 NEED FOR HEPATITIS C SCREENING TEST: ICD-10-CM

## 2023-02-27 DIAGNOSIS — R00.1 BRADYCARDIA: ICD-10-CM

## 2023-02-27 PROBLEM — N46.9: Status: ACTIVE | Noted: 2023-02-27

## 2023-02-27 PROCEDURE — 99386 PREV VISIT NEW AGE 40-64: CPT | Performed by: STUDENT IN AN ORGANIZED HEALTH CARE EDUCATION/TRAINING PROGRAM

## 2023-02-27 PROCEDURE — 99204 OFFICE O/P NEW MOD 45 MIN: CPT | Performed by: STUDENT IN AN ORGANIZED HEALTH CARE EDUCATION/TRAINING PROGRAM

## 2023-02-27 RX ORDER — SILDENAFIL 100 MG/1
50 TABLET, FILM COATED ORAL
COMMUNITY
Start: 2022-08-25

## 2023-02-27 SDOH — ECONOMIC STABILITY: FOOD INSECURITY: WITHIN THE PAST 12 MONTHS, YOU WORRIED THAT YOUR FOOD WOULD RUN OUT BEFORE YOU GOT MONEY TO BUY MORE.: NEVER TRUE

## 2023-02-27 SDOH — HEALTH STABILITY: PHYSICAL HEALTH: ON AVERAGE, HOW MANY MINUTES DO YOU ENGAGE IN EXERCISE AT THIS LEVEL?: 60 MIN

## 2023-02-27 SDOH — ECONOMIC STABILITY: INCOME INSECURITY: HOW HARD IS IT FOR YOU TO PAY FOR THE VERY BASICS LIKE FOOD, HOUSING, MEDICAL CARE, AND HEATING?: NOT HARD AT ALL

## 2023-02-27 SDOH — ECONOMIC STABILITY: HOUSING INSECURITY
IN THE LAST 12 MONTHS, WAS THERE A TIME WHEN YOU DID NOT HAVE A STEADY PLACE TO SLEEP OR SLEPT IN A SHELTER (INCLUDING NOW)?: NO

## 2023-02-27 SDOH — HEALTH STABILITY: PHYSICAL HEALTH: ON AVERAGE, HOW MANY DAYS PER WEEK DO YOU ENGAGE IN MODERATE TO STRENUOUS EXERCISE (LIKE A BRISK WALK)?: 5 DAYS

## 2023-02-27 SDOH — ECONOMIC STABILITY: FOOD INSECURITY: WITHIN THE PAST 12 MONTHS, THE FOOD YOU BOUGHT JUST DIDN'T LAST AND YOU DIDN'T HAVE MONEY TO GET MORE.: NEVER TRUE

## 2023-02-27 ASSESSMENT — PATIENT HEALTH QUESTIONNAIRE - PHQ9
SUM OF ALL RESPONSES TO PHQ QUESTIONS 1-9: 0
SUM OF ALL RESPONSES TO PHQ9 QUESTIONS 1 & 2: 0
SUM OF ALL RESPONSES TO PHQ QUESTIONS 1-9: 0
SUM OF ALL RESPONSES TO PHQ QUESTIONS 1-9: 0
2. FEELING DOWN, DEPRESSED OR HOPELESS: 0
1. LITTLE INTEREST OR PLEASURE IN DOING THINGS: 0
SUM OF ALL RESPONSES TO PHQ QUESTIONS 1-9: 0

## 2023-02-27 ASSESSMENT — SOCIAL DETERMINANTS OF HEALTH (SDOH)
WITHIN THE LAST YEAR, HAVE YOU BEEN KICKED, HIT, SLAPPED, OR OTHERWISE PHYSICALLY HURT BY YOUR PARTNER OR EX-PARTNER?: NO
WITHIN THE LAST YEAR, HAVE YOU BEEN AFRAID OF YOUR PARTNER OR EX-PARTNER?: NO
WITHIN THE LAST YEAR, HAVE YOU BEEN HUMILIATED OR EMOTIONALLY ABUSED IN OTHER WAYS BY YOUR PARTNER OR EX-PARTNER?: NO
WITHIN THE LAST YEAR, HAVE TO BEEN RAPED OR FORCED TO HAVE ANY KIND OF SEXUAL ACTIVITY BY YOUR PARTNER OR EX-PARTNER?: NO

## 2023-02-27 NOTE — PROGRESS NOTES
History and Physical      Irineo Molina  YOB: 1980    Date of Service:  2/27/2023    Chief Complaint:   Irineo Molina is a 38 y.o. male who presents for complete physical examination. HPI: Pleasant 72-year-old gentleman presenting today to establish care and for CPE. 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 Patient states he is currently engaged in an intense exercise regimen, and endeavors to eat healthy. Patient currently does not use alcohol (states he quit last year). Patient does not smoke, but states he dips tobacco.  Patient denies any use of illicit substances. Of note, patient states that on some occasions he has had very low heart rates. He states during one of his visits with his dentist, his heart rate was noted to be in the 40s. He states he was made to wait and drink fluid to increase his blood volume before leaving the office. He also states that during his sleep his heart rate may go as low as 30s (wears a Fitbit watch). Patient occasional dizziness, but denies any history of palpitations or syncopal events. Patient at this time denies any chest pain, SOB, cough, subjective fever, genitourinary symptoms, N/V/D.        Wt Readings from Last 3 Encounters:   02/27/23 235 lb 3.2 oz (106.7 kg)   09/06/22 225 lb (102.1 kg)   03/22/22 225 lb (102.1 kg)     BP Readings from Last 3 Encounters:   02/27/23 111/70       Patient Active Problem List   Diagnosis    Seasonal allergic rhinitis    Gastroesophageal reflux disease without esophagitis    Acquired infertility in male       Allergies   Allergen Reactions    Metoclopramide      Other reaction(s): Unknown (comments)     Outpatient Medications Marked as Taking for the 2/27/23 encounter (Office Visit) with Cate Perales, DO   Medication Sig Dispense Refill    sildenafil (VIAGRA) 100 MG tablet 50 mg      buPROPion (WELLBUTRIN XL) 150 MG extended release tablet Take 150 mg by mouth Cetirizine HCl (ZYRTEC ALLERGY) 10 MG CAPS Take by mouth      melatonin 3 MG TABS tablet Take 3 mg by mouth      pantoprazole (PROTONIX) 40 MG tablet TAKE 1 TABLET BY MOUTH ONCE DAILY         Past Medical History:   Diagnosis Date    Depression     Diverticulitis     GERD (gastroesophageal reflux disease)      Past Surgical History:   Procedure Laterality Date    TOTAL COLECTOMY  2004    VASECTOMY  02/23/2022     Family History   Problem Relation Age of Onset    Cancer Maternal Grandfather      Social History     Socioeconomic History    Marital status:      Spouse name: Not on file    Number of children: Not on file    Years of education: Not on file    Highest education level: Not on file   Occupational History    Not on file   Tobacco Use    Smoking status: Never    Smokeless tobacco: Current   Vaping Use    Vaping Use: Never used   Substance and Sexual Activity    Alcohol use: Not Currently    Drug use: No    Sexual activity: Yes     Partners: Female   Other Topics Concern    Not on file   Social History Narrative    Not on file     Social Determinants of Health     Financial Resource Strain: Low Risk     Difficulty of Paying Living Expenses: Not hard at all   Food Insecurity: No Food Insecurity    Worried About Running Out of Food in the Last Year: Never true    Ran Out of Food in the Last Year: Never true   Transportation Needs: Unknown    Lack of Transportation (Medical): Not on file    Lack of Transportation (Non-Medical):  No   Physical Activity: Sufficiently Active    Days of Exercise per Week: 5 days    Minutes of Exercise per Session: 60 min   Stress: Not on file   Social Connections: Not on file   Intimate Partner Violence: Not At Risk    Fear of Current or Ex-Partner: No    Emotionally Abused: No    Physically Abused: No    Sexually Abused: No   Housing Stability: Unknown    Unable to Pay for Housing in the Last Year: Not on file    Number of Places Lived in the Last Year: Not on file Unstable Housing in the Last Year: No       Review of Systems:  A comprehensive review of systems was negative except for what was noted in the HPI. Physical Exam:   Vitals:    02/27/23 1228   BP: 111/70   Site: Left Upper Arm   Position: Sitting   Cuff Size: Large Adult   Pulse: 57   Resp: 16   Temp: 98 °F (36.7 °C)   TempSrc: Temporal   SpO2: 99%   Weight: 235 lb 3.2 oz (106.7 kg)   Height: 5' 6\" (1.676 m)     Body mass index is 37.96 kg/m². Physical Exam  Constitutional:       General: He is not in acute distress. Appearance: Normal appearance. He is obese. He is not ill-appearing, toxic-appearing or diaphoretic. HENT:      Head: Normocephalic and atraumatic. Right Ear: Tympanic membrane, ear canal and external ear normal. There is no impacted cerumen. Left Ear: Tympanic membrane, ear canal and external ear normal. There is no impacted cerumen. Nose: Nose normal. No congestion or rhinorrhea. Mouth/Throat:      Mouth: Mucous membranes are moist.      Pharynx: Oropharynx is clear. No oropharyngeal exudate or posterior oropharyngeal erythema. Eyes:      General: No scleral icterus. Right eye: No discharge. Left eye: No discharge. Extraocular Movements: Extraocular movements intact. Conjunctiva/sclera: Conjunctivae normal.      Pupils: Pupils are equal, round, and reactive to light. Cardiovascular:      Rate and Rhythm: Regular rhythm. Bradycardia present. Pulses: Normal pulses. Heart sounds: Normal heart sounds. No murmur heard. No friction rub. No gallop. Pulmonary:      Effort: Pulmonary effort is normal.      Breath sounds: Normal breath sounds. No wheezing, rhonchi or rales. Abdominal:      General: Abdomen is flat. Bowel sounds are normal.      Palpations: Abdomen is soft. There is no mass. Tenderness: There is no abdominal tenderness. There is no right CVA tenderness or left CVA tenderness.    Musculoskeletal:         General: Normal range of motion. Cervical back: Normal range of motion and neck supple. Right lower leg: No edema. Left lower leg: No edema. Lymphadenopathy:      Cervical: No cervical adenopathy. Skin:     General: Skin is warm. Neurological:      General: No focal deficit present. Mental Status: He is alert and oriented to person, place, and time. Psychiatric:         Mood and Affect: Mood normal.         Behavior: Behavior normal.   .     Preventive Care:  Health Maintenance   Topic Date Due    COVID-19 Vaccine (1) Never done    HIV screen  Never done    Hepatitis C screen  Never done    Varicella vaccine (1 of 2 - 2-dose childhood series) Never done    Diabetes screen  Never done    Lipids  Never done    Flu vaccine (1) 08/01/2022    Depression Screen  02/27/2024    DTaP/Tdap/Td vaccine (3 - Td or Tdap) 08/25/2032    Hepatitis A vaccine  Completed    Orthopox (Smallpox/Monkeypox) Vaccine  Completed    Hib vaccine  Aged Out    Meningococcal (ACWY) vaccine  Aged Out    Pneumococcal 0-64 years Vaccine  Aged Out      Self-testicular exams: Yes  Sexual activity: With spouse  Last eye exam: Couple of years ago, abnormal ->counseled patient is currently for new appointment. Exercise: Currently on an exercise regimen called 75 hard (daily) on July  Seatbelt use: Yes       Preventive plan of care for Collette Grosser        2/27/2023           Preventive Measures Status       Recommendations for screening   Prostate Cancer Screen  No results found for: PSA   Not indicated   Colon Cancer Screen  Last colonoscopy: N/A Not indicated   Diabetes Screen  No results found for: GLUCOSE Test recommended and ordered   Cholesterol Screen  No results found for: CHOL, TRIG, HDL, LDLCALC, LDLDIRECT Test recommended and ordered   Aspirin for Cardiovascular Prevention   No Not indicated   Weight: Body mass index is 37.96 kg/m².   5' 6\" (1.676 m)235 lb 3.2 oz (106.7 kg)  Offered but declined referral to weight management Living Will: No Patient declined    Recommended Immunizations    Immunization History   Administered Date(s) Administered    Influenza A ([id number]) Vaccine Nasal 12/11/2009, 02/11/2010    Influenza Virus Vaccine 12/10/2001, 11/19/2003, 12/16/2005, 12/14/2008, 12/14/2009, 09/29/2010    Meningococcal MPSV4 (Menomune) 06/30/2000    PPD Test 06/23/2000, 06/27/2000, 07/30/2004, 04/17/2007, 07/07/2009, 04/27/2011    Polio IPV (IPOL) 06/30/2000, 07/30/2000    Smallpox ([id number]) 09/11/2008    Tdap (Boostrix, Adacel) 07/07/2009, 08/25/2022    Typhoid Vac, Parenteral, Other Than Acetone-killed, Dried 03/22/2001    Typhoid Vi capsular polysaccharide (Typhim VI) 07/30/2003, 04/17/2007, 04/19/2007, 07/22/2008, 03/24/2011    Yellow Fever (YF-Vax) 06/30/2000, 03/24/2011          Other Recommendations   Follow up in this office in 6 months for re-evaluation/progress on weight loss  See an eye specialist every 1 years  See a dentist every 6 months  You should stop all tobacco use- tobacco cessation resources provided  Try to get at least 30 minutes of exercise 3-4 days per week  Always wear a seat belt when traveling in a car             Assessment/Plan:  Sol Ellington was seen today for mass. Diagnoses and all orders for this visit:    Well adult exam  38 y.o.  gentleman presenting today to establish care and for CPE. Patient at this time denies any acute medical issues and appears in stable health. Class 2 obesity due to excess calories with body mass index (BMI) of 37.0 to 37.9 in adult, unspecified whether serious comorbidity present  Patient's BMI is noted to be Body mass index is 37.96 kg/m². It is recommended that you complete aerobic exercise 30-40 minutes a day, at least 3-5 days a week. Aerobic exercises are activities such as brisk walking, running/jogging, dancing, biking, swimming. Patient has also been encouraged to join a gym  Healthy diet: Eat real food! Avoid or minimize all processed food.    Patient has been encouraged to set a target weight loss of 1 to 2 pounds per week  Patient was offered referral to weight management but declined. Patient is currently enrolled in an online weight loss program called \"75 hard\" which she states has enabled him lose significant amount of weight. Patient appears very motivated. We will continue to monitor. Plan is acceptable to patient. Screening for hyperlipidemia  -     Lipid Panel; Future    Screening for HIV without presence of risk factors  -     HIV Screen; Future    Need for hepatitis C screening test  -     Hepatitis C Antibody; Future    Encounter for screening for diabetes mellitus  -     Hemoglobin A1C; Future    Bradycardia  -     [address] Claude Collazo MD, Cardiology, [facility] ([facility])    H/O dizziness  -     Claude Portillo MD, Cardiology, [facility] ([facility])    This note was dictated utilizing voice recognition software which may lead to typographical errors. I apologize in advance if the situation occurs. If questions arise please do not hesitate to contact me or call our department.

## 2023-02-27 NOTE — PROGRESS NOTES
1. \"Have you been to the ER, urgent care clinic since your last visit? Hospitalized since your last visit? \" No    2. \"Have you seen or consulted any other health care providers outside of the [address] [place] [address] since your last visit? \" No     3. For patients aged 39-70: Has the patient had a colonoscopy / FIT/ Cologuard?  Yes - no Care Gap present

## 2023-04-27 ENCOUNTER — OFFICE VISIT (OUTPATIENT)
Age: 43
End: 2023-04-27
Payer: COMMERCIAL

## 2023-04-27 VITALS
HEIGHT: 66 IN | HEART RATE: 57 BPM | DIASTOLIC BLOOD PRESSURE: 80 MMHG | WEIGHT: 240 LBS | BODY MASS INDEX: 38.57 KG/M2 | OXYGEN SATURATION: 99 % | SYSTOLIC BLOOD PRESSURE: 134 MMHG

## 2023-04-27 DIAGNOSIS — R00.1 BRADYCARDIA, UNSPECIFIED: Primary | ICD-10-CM

## 2023-04-27 DIAGNOSIS — R42 DIZZINESS: ICD-10-CM

## 2023-04-27 PROBLEM — N53.14 RETROGRADE EJACULATION: Status: ACTIVE | Noted: 2023-04-27

## 2023-04-27 PROBLEM — R07.9 CHEST PAIN, UNSPECIFIED: Status: ACTIVE | Noted: 2023-04-27

## 2023-04-27 PROBLEM — M25.519 SHOULDER PAIN: Status: ACTIVE | Noted: 2023-04-27

## 2023-04-27 PROBLEM — K58.9 IRRITABLE BOWEL SYNDROME: Status: ACTIVE | Noted: 2018-04-26

## 2023-04-27 PROBLEM — K21.9 GASTROESOPHAGEAL REFLUX DISEASE: Status: ACTIVE | Noted: 2018-06-13

## 2023-04-27 PROBLEM — F43.23 ADJUSTMENT DISORDER WITH MIXED ANXIETY AND DEPRESSED MOOD: Status: ACTIVE | Noted: 2023-04-27

## 2023-04-27 PROBLEM — E66.01 SEVERE OBESITY WITH BODY MASS INDEX (BMI) OF 35.0 TO 39.9 WITH SERIOUS COMORBIDITY (HCC): Status: ACTIVE | Noted: 2018-06-13

## 2023-04-27 PROBLEM — F52.21 MALE ERECTILE DISORDER (CODE): Status: ACTIVE | Noted: 2023-04-27

## 2023-04-27 PROBLEM — T14.8XXA MUSCLE STRAIN: Status: ACTIVE | Noted: 2023-04-27

## 2023-04-27 PROBLEM — M54.50 LOW BACK PAIN, UNSPECIFIED: Status: ACTIVE | Noted: 2023-04-27

## 2023-04-27 PROBLEM — G47.30 SLEEP APNEA: Status: ACTIVE | Noted: 2023-04-27

## 2023-04-27 PROBLEM — B07.9 VIRAL WART ON FINGER: Status: ACTIVE | Noted: 2023-04-27

## 2023-04-27 PROBLEM — F43.10 POSTTRAUMATIC STRESS DISORDER: Status: ACTIVE | Noted: 2023-04-27

## 2023-04-27 PROBLEM — H52.10 MYOPIA: Status: ACTIVE | Noted: 2023-04-27

## 2023-04-27 PROBLEM — F33.8 OTHER RECURRENT DEPRESSIVE DISORDERS (HCC): Status: ACTIVE | Noted: 2018-06-13

## 2023-04-27 PROBLEM — F32.9 MAJOR DEPRESSIVE DISORDER, SINGLE EPISODE, UNSPECIFIED: Status: ACTIVE | Noted: 2023-04-27

## 2023-04-27 PROBLEM — G97.1 POST-LUMBAR PUNCTURE HEADACHE: Status: ACTIVE | Noted: 2023-04-27

## 2023-04-27 PROBLEM — R33.9 RETENTION OF URINE: Status: ACTIVE | Noted: 2023-04-27

## 2023-04-27 PROBLEM — T81.40XA INFECTION FOLLOWING A PROCEDURE, UNSPECIFIED, INITIAL ENCOUNTER: Status: ACTIVE | Noted: 2023-04-27

## 2023-04-27 PROCEDURE — 99203 OFFICE O/P NEW LOW 30 MIN: CPT | Performed by: INTERNAL MEDICINE

## 2023-04-27 PROCEDURE — 93000 ELECTROCARDIOGRAM COMPLETE: CPT | Performed by: INTERNAL MEDICINE

## 2023-04-27 ASSESSMENT — PATIENT HEALTH QUESTIONNAIRE - PHQ9
SUM OF ALL RESPONSES TO PHQ9 QUESTIONS 1 & 2: 0
2. FEELING DOWN, DEPRESSED OR HOPELESS: 0
3. TROUBLE FALLING OR STAYING ASLEEP: 0
9. THOUGHTS THAT YOU WOULD BE BETTER OFF DEAD, OR OF HURTING YOURSELF: 0
5. POOR APPETITE OR OVEREATING: 0
SUM OF ALL RESPONSES TO PHQ QUESTIONS 1-9: 0
10. IF YOU CHECKED OFF ANY PROBLEMS, HOW DIFFICULT HAVE THESE PROBLEMS MADE IT FOR YOU TO DO YOUR WORK, TAKE CARE OF THINGS AT HOME, OR GET ALONG WITH OTHER PEOPLE: 0
1. LITTLE INTEREST OR PLEASURE IN DOING THINGS: 0
8. MOVING OR SPEAKING SO SLOWLY THAT OTHER PEOPLE COULD HAVE NOTICED. OR THE OPPOSITE, BEING SO FIGETY OR RESTLESS THAT YOU HAVE BEEN MOVING AROUND A LOT MORE THAN USUAL: 0
SUM OF ALL RESPONSES TO PHQ QUESTIONS 1-9: 0
7. TROUBLE CONCENTRATING ON THINGS, SUCH AS READING THE NEWSPAPER OR WATCHING TELEVISION: 0
SUM OF ALL RESPONSES TO PHQ QUESTIONS 1-9: 0
4. FEELING TIRED OR HAVING LITTLE ENERGY: 0
SUM OF ALL RESPONSES TO PHQ QUESTIONS 1-9: 0
6. FEELING BAD ABOUT YOURSELF - OR THAT YOU ARE A FAILURE OR HAVE LET YOURSELF OR YOUR FAMILY DOWN: 0

## 2023-04-27 ASSESSMENT — ENCOUNTER SYMPTOMS
SORE THROAT: 0
VOMITING: 0
SHORTNESS OF BREATH: 0
NAUSEA: 0
ABDOMINAL PAIN: 0
COUGH: 0
ABDOMINAL DISTENTION: 0

## 2023-04-27 NOTE — PROGRESS NOTES
Do Corona presents today for   Chief Complaint   Patient presents with    New Patient     Ref by PCP for a history of Dizziness       Do Corona preferred language for health care discussion is english/other. Is someone accompanying this pt?  no    Is the patient using any DME equipment during OV? no    Depression Screening:  Depression: Not at risk    PHQ-2 Score: 0        Learning Assessment:  Who is the primary learner? Patient    What is the preferred language for health care of the primary learner? ENGLISH    How does the primary learner prefer to learn new concepts? DEMONSTRATION    Answered By patient    Relationship to Learner SELF           Pt currently taking Anticoagulant therapy? no    Pt currently taking Antiplatelet therapy ? no      Coordination of Care:  1. Have you been to the ER, urgent care clinic since your last visit? Hospitalized since your last visit? no    2. Have you seen or consulted any other health care providers outside of the [address] [place] [address] since your last visit? Include any pap smears or colon screening.  no

## 2023-04-27 NOTE — PROGRESS NOTES
04/27/23     Aydin Graham  is a 38 y.o. male     Chief Complaint   Patient presents with    New Patient     Ref by PCP for a history of Dizziness       HPI    Patient presents for a new office visit. He was referred here by his PCP for evaluation of sinus bradycardia. Patient states that he has been told that his heart rate has been on the low side the last few years he has been seeing physicians. He has not noted any excessive fatigue or exercise intolerance. He did wear a Holter monitor around 2021 through the [place] which was reportedly unremarkable. Up until recently, he states he was trying to walk 4 to 5 miles a day at a brisk pace. He would really K-O-R-T his heart rate on his Apple Watch and it would increase to the low 100s with exertion. He never experienced any exertional dizziness, chest tightness or shortness of breath. He has never had a syncopal or near syncopal episode. He does admit to occasional dizzy spells when he stands up too quickly. Past Medical History:   Diagnosis Date    Depression     Diverticulitis     GERD (gastroesophageal reflux disease)      Current Outpatient Medications   Medication Sig Dispense Refill    vitamin D (CHOLECALCIFEROL) 25 MCG (1000 UT) TABS tablet Take 2 tablets by mouth daily      sildenafil (VIAGRA) 100 MG tablet 1 tablet as needed      buPROPion (WELLBUTRIN XL) 150 MG extended release tablet Take 1 tablet by mouth every morning      Cetirizine HCl (ZYRTEC ALLERGY) 10 MG CAPS Take 10 mg by mouth daily      melatonin 3 MG TABS tablet Take 1 tablet by mouth nightly as needed      pantoprazole (PROTONIX) 40 MG tablet Take 1 tablet by mouth daily       No current facility-administered medications for this visit.      Allergies   Allergen Reactions    Metoclopramide      Other reaction(s): Unknown (comments)     Social History     Tobacco Use    Smoking status: Never    Smokeless tobacco: Current     Types: Chew    Tobacco comments:     I dip tobacco about 2 cans

## 2023-08-28 ENCOUNTER — OFFICE VISIT (OUTPATIENT)
Age: 43
End: 2023-08-28
Payer: COMMERCIAL

## 2023-08-28 VITALS
BODY MASS INDEX: 39.05 KG/M2 | HEART RATE: 54 BPM | OXYGEN SATURATION: 98 % | DIASTOLIC BLOOD PRESSURE: 65 MMHG | HEIGHT: 66 IN | SYSTOLIC BLOOD PRESSURE: 114 MMHG | TEMPERATURE: 97.8 F | RESPIRATION RATE: 20 BRPM | WEIGHT: 243 LBS

## 2023-08-28 DIAGNOSIS — E78.5 DYSLIPIDEMIA: ICD-10-CM

## 2023-08-28 DIAGNOSIS — Z00.00 WELL ADULT EXAM: ICD-10-CM

## 2023-08-28 DIAGNOSIS — E66.01 CLASS 2 SEVERE OBESITY DUE TO EXCESS CALORIES WITH SERIOUS COMORBIDITY AND BODY MASS INDEX (BMI) OF 39.0 TO 39.9 IN ADULT (HCC): Primary | ICD-10-CM

## 2023-08-28 PROCEDURE — 99213 OFFICE O/P EST LOW 20 MIN: CPT | Performed by: STUDENT IN AN ORGANIZED HEALTH CARE EDUCATION/TRAINING PROGRAM

## 2023-08-28 ASSESSMENT — PATIENT HEALTH QUESTIONNAIRE - PHQ9
SUM OF ALL RESPONSES TO PHQ QUESTIONS 1-9: 0
SUM OF ALL RESPONSES TO PHQ9 QUESTIONS 1 & 2: 0
SUM OF ALL RESPONSES TO PHQ QUESTIONS 1-9: 0
2. FEELING DOWN, DEPRESSED OR HOPELESS: 0
1. LITTLE INTEREST OR PLEASURE IN DOING THINGS: 0

## 2023-08-28 ASSESSMENT — ENCOUNTER SYMPTOMS
EYE DISCHARGE: 0
SORE THROAT: 0
RHINORRHEA: 0
TROUBLE SWALLOWING: 0
CONSTIPATION: 0
VOMITING: 0
PHOTOPHOBIA: 0
WHEEZING: 0
ABDOMINAL PAIN: 0
DIARRHEA: 0
VOICE CHANGE: 0
SHORTNESS OF BREATH: 0
EYE ITCHING: 0
COUGH: 0
NAUSEA: 0
BACK PAIN: 0

## 2023-08-28 NOTE — PATIENT INSTRUCTIONS
medicine. Symptoms can include difficulty breathing, skin rash, itching, swelling, or severe dizziness. If you notice any of these symptoms, seek medical help quickly. Please speak with your doctor, nurse, or pharmacist if you have any questions about this medicine. IMPORTANT NOTE: This document tells you briefly how to take your medicine, but it does not tell you all there is to know about it. Your doctor or pharmacist may give you other documents about your medicine. Please talk to them if you have any questions. Always follow their advice. There is a more complete description of this medicine available in [facility]. Scan this code on your smartphone or tablet or use the web address below. You can also ask your pharmacist for a printout. If you have any questions, please ask your pharmacist.  The display and use of this drug information is subject to Terms of Use.  [email or web address]"[email or web address]     Copyright(c) 2023 [employer]  Selected from data included with permission and copyright by [employer]. This copyrighted material has been downloaded from a licensed data provider and is not for distribution, except as may be authorized by the applicable terms of use. Conditions of Use: The information in this database is intended to supplement, not substitute for the expertise and judgment of healthcare professionals. The information is not intended to cover all possible uses, directions, precautions, drug interactions or adverse effects nor should it be construed to indicate that use of a particular drug is safe, appropriate or effective for you or anyone else. A healthcare professional should be consulted before taking any drug, changing any diet or commencing or discontinuing any course of treatment. The display and use of this drug information is subject to express Terms of Use. Care instructions adapted under license by [facility] ([facility]).  If you have questions about a medical

## 2023-08-28 NOTE — PROGRESS NOTES
Chief Complaint   Patient presents with    Obesity         1. \"Have you been to the ER, urgent care clinic since your last visit? Hospitalized since your last visit? \" No    2. \"Have you seen or consulted any other health care providers outside of the [address] [place] [address] since your last visit? \" No     3. For patients aged 43-73: Has the patient had a colonoscopy / FIT/ Cologuard?  NA - based on age      Okay to discontinue medications no longer taking per verbal order Dr. Mickie Aguirre

## 2023-11-20 ENCOUNTER — OFFICE VISIT (OUTPATIENT)
Age: 43
End: 2023-11-20
Payer: COMMERCIAL

## 2023-11-20 VITALS
HEIGHT: 66 IN | BODY MASS INDEX: 39.05 KG/M2 | RESPIRATION RATE: 20 BRPM | TEMPERATURE: 97 F | WEIGHT: 243 LBS | HEART RATE: 57 BPM | DIASTOLIC BLOOD PRESSURE: 82 MMHG | SYSTOLIC BLOOD PRESSURE: 130 MMHG | OXYGEN SATURATION: 98 %

## 2023-11-20 DIAGNOSIS — M25.571 ACUTE RIGHT ANKLE PAIN: Primary | ICD-10-CM

## 2023-11-20 PROCEDURE — 99213 OFFICE O/P EST LOW 20 MIN: CPT | Performed by: STUDENT IN AN ORGANIZED HEALTH CARE EDUCATION/TRAINING PROGRAM

## 2023-11-20 RX ORDER — IBUPROFEN 800 MG/1
800 TABLET ORAL EVERY 8 HOURS PRN
Qty: 90 TABLET | Refills: 0 | Status: SHIPPED | OUTPATIENT
Start: 2023-11-20 | End: 2023-12-20

## 2023-11-20 RX ORDER — BUPROPION HYDROCHLORIDE 300 MG/1
TABLET ORAL
COMMUNITY
Start: 2023-08-15

## 2023-11-20 ASSESSMENT — ENCOUNTER SYMPTOMS
WHEEZING: 0
PHOTOPHOBIA: 0
RHINORRHEA: 0
DIARRHEA: 0
VOICE CHANGE: 0
SORE THROAT: 0
ABDOMINAL PAIN: 0
VOMITING: 0
NAUSEA: 0
BACK PAIN: 0
COUGH: 0
EYE DISCHARGE: 0
CONSTIPATION: 0
EYE ITCHING: 0
SHORTNESS OF BREATH: 0
TROUBLE SWALLOWING: 0

## 2023-11-20 ASSESSMENT — PATIENT HEALTH QUESTIONNAIRE - PHQ9
SUM OF ALL RESPONSES TO PHQ QUESTIONS 1-9: 0
SUM OF ALL RESPONSES TO PHQ9 QUESTIONS 1 & 2: 0
2. FEELING DOWN, DEPRESSED OR HOPELESS: 0
1. LITTLE INTEREST OR PLEASURE IN DOING THINGS: 0
SUM OF ALL RESPONSES TO PHQ QUESTIONS 1-9: 0

## 2023-11-20 NOTE — PROGRESS NOTES
Chief Complaint   Patient presents with    Ankle Pain     Onset 19 days ago. States he stepped out of truck and twisted ankle. Was seen at [facility] a few days later. Xray WNL. Pain rated 2/10 Characteristics: Intermittent sharp shooting pain. States pain is positional.      1. Have you been to the ER, urgent care clinic since your last visit? Hospitalized since your last visit? No    2. Have you seen or consulted any other health care providers outside of the [address] [place] [address] Avenue since your last visit? Include any pap smears or colon screening.  No
Constitutional:       General: He is not in acute distress. Appearance: Normal appearance. He is obese. He is not ill-appearing, toxic-appearing or diaphoretic. HENT:      Head: Normocephalic and atraumatic. Right Ear: External ear normal.      Left Ear: External ear normal.   Eyes:      General:         Right eye: No discharge. Left eye: No discharge. Cardiovascular:      Rate and Rhythm: Normal rate and regular rhythm. Pulses: Normal pulses. Heart sounds: Normal heart sounds. No murmur heard. No friction rub. No gallop. Pulmonary:      Effort: Pulmonary effort is normal.      Breath sounds: Normal breath sounds. No wheezing, rhonchi or rales. Musculoskeletal:         General: Tenderness (Right ankle) present. No swelling, deformity or signs of injury. Normal range of motion. Cervical back: Normal range of motion. Skin:     General: Skin is warm. Neurological:      Mental Status: He is alert and oriented to person, place, and time. Mental status is at baseline. Psychiatric:         Mood and Affect: Mood normal.         Behavior: Behavior normal.         Assessment / Plan:   Arden Anderson was seen today for ankle pain. Diagnoses and all orders for this visit:    Acute right ankle pain  Comments:  Endorses improvement. Advised to continue RICE. Patient advised to call back if problem worsens. Patient confirms understanding. Orders:  -     ibuprofen (ADVIL;MOTRIN) 800 MG tablet; Take 1 tablet by mouth every 8 hours as needed for Pain      Return if symptoms worsen or fail to improve. This note was dictated utilizing voice recognition software which may lead to typographical errors. I apologize in advance if the situation occurs. If questions arise please do not hesitate to contact me or call our department.

## 2023-11-20 NOTE — PATIENT INSTRUCTIONS
Patient Education        Ankle: Exercises  Introduction  Here are some examples of exercises for you to try. The exercises may be suggested for a condition or for rehabilitation. Start each exercise slowly. Ease off the exercises if you start to have pain. You will be told when to start these exercises and which ones will work best for you. How to do the exercises  'Alphabet' exercise    Trace the alphabet with your toe. This helps your ankle move in all directions. Side-to-side knee swing exercise    Sit in a chair with your foot flat on the floor. Slowly move your knee from side to side while keeping your foot pressed flat. Continue this exercise for 2 to 3 minutes. Towel curl    While sitting, place your foot on a towel on the floor and scrunch the towel toward you with your toes. Then use your toes to push the towel away from you. Make this exercise more challenging by placing a weighted object, such as a soup can, on the other end of the towel. Towel stretch    Sit with your legs extended and knees straight. Place a towel around your foot just under the toes. Hold each end of the towel in each hand, with your hands above your knees. Pull back with the towel so that your foot stretches toward you. Hold the position for at least 15 to 30 seconds. Repeat 2 to 4 times a session, up to 5 sessions a day. Ankle eversion exercise    Start by sitting with your foot flat on the floor and pushing it outward against an immovable object such as the wall or heavy furniture. Hold for about 6 seconds, then relax. Repeat 8 to 12 times. After you feel comfortable with this, try using rubber tubing looped around the outside of your feet for resistance. Push your foot out to the side against the tubing, and then count to 10 as you slowly bring your foot back to the middle. Repeat 8 to 12 times. Isometric opposition exercises    While sitting, put your feet together flat on the floor.   Press your injured foot

## 2024-02-24 LAB
A/G RATIO: 1.8 RATIO (ref 1.1–2.6)
ALBUMIN SERPL-MCNC: 4.2 G/DL (ref 3.5–5)
ALP BLD-CCNC: 84 U/L (ref 25–115)
ALT SERPL-CCNC: 34 U/L (ref 5–40)
ANION GAP SERPL CALCULATED.3IONS-SCNC: 9 MMOL/L (ref 3–15)
AST SERPL-CCNC: 19 U/L (ref 10–37)
BASOPHILS # BLD: 0 % (ref 0–2)
BASOPHILS ABSOLUTE: 0 K/UL (ref 0–0.2)
BILIRUB SERPL-MCNC: 0.6 MG/DL (ref 0.2–1.2)
BUN BLDV-MCNC: 12 MG/DL (ref 6–22)
CALCIUM SERPL-MCNC: 9.1 MG/DL (ref 8.4–10.5)
CHLORIDE BLD-SCNC: 100 MMOL/L (ref 98–110)
CHOLESTEROL/HDL RATIO: 5.6 (ref 0–5)
CHOLESTEROL: 218 MG/DL (ref 110–200)
CO2: 28 MMOL/L (ref 20–32)
CREAT SERPL-MCNC: 1.2 MG/DL (ref 0.5–1.2)
EOSINOPHIL # BLD: 2 % (ref 0–6)
EOSINOPHILS ABSOLUTE: 0.1 K/UL (ref 0–0.5)
GLOBULIN: 2.3 G/DL (ref 2–4)
GLOMERULAR FILTRATION RATE: >60 ML/MIN/1.73 SQ.M.
GLUCOSE: 91 MG/DL (ref 70–99)
HCT VFR BLD CALC: 45.1 % (ref 36.6–51.9)
HDLC SERPL-MCNC: 39 MG/DL
HEMOGLOBIN: 14.4 G/DL (ref 13.2–17.3)
HIV -1/0/2 AG/AB WITH REFLEX: NON REACTIVE
HIV INTERPRETATION: NORMAL
LDL CHOLESTEROL CALCULATED: 162 MG/DL (ref 50–99)
LDL/HDL RATIO: 4.2
LYMPHOCYTES # BLD: 26 % (ref 20–45)
LYMPHOCYTES ABSOLUTE: 1.8 K/UL (ref 1–4.8)
MCH RBC QN AUTO: 29 PG (ref 26–34)
MCHC RBC AUTO-ENTMCNC: 32 G/DL (ref 31–36)
MCV RBC AUTO: 91 FL (ref 80–95)
MONOCYTES ABSOLUTE: 0.5 K/UL (ref 0.1–1)
MONOCYTES: 7 % (ref 3–12)
NEUTROPHILS ABSOLUTE: 4.3 K/UL (ref 1.8–7.7)
NEUTROPHILS: 65 % (ref 40–75)
NON-HDL CHOLESTEROL: 179 MG/DL
PDW BLD-RTO: 13.8 % (ref 10–15.5)
PLATELET # BLD: 261 K/UL (ref 140–440)
PMV BLD AUTO: 10.8 FL (ref 9–13)
POTASSIUM SERPL-SCNC: 4 MMOL/L (ref 3.5–5.5)
RBC: 4.95 M/UL (ref 3.8–5.8)
SODIUM BLD-SCNC: 137 MMOL/L (ref 133–145)
TOTAL PROTEIN: 6.5 G/DL (ref 6.4–8.3)
TRIGL SERPL-MCNC: 83 MG/DL (ref 40–149)
VLDLC SERPL CALC-MCNC: 17 MG/DL (ref 8–30)
WBC: 6.7 K/UL (ref 4–11)

## 2024-03-01 ENCOUNTER — OFFICE VISIT (OUTPATIENT)
Age: 44
End: 2024-03-01

## 2024-03-01 VITALS
OXYGEN SATURATION: 98 % | HEIGHT: 66 IN | SYSTOLIC BLOOD PRESSURE: 115 MMHG | WEIGHT: 245 LBS | DIASTOLIC BLOOD PRESSURE: 73 MMHG | BODY MASS INDEX: 39.37 KG/M2 | TEMPERATURE: 97.3 F | HEART RATE: 56 BPM | RESPIRATION RATE: 18 BRPM

## 2024-03-01 DIAGNOSIS — Z00.00 ENCOUNTER FOR WELL ADULT EXAM WITHOUT ABNORMAL FINDINGS: Primary | ICD-10-CM

## 2024-03-01 DIAGNOSIS — E66.01 CLASS 2 SEVERE OBESITY DUE TO EXCESS CALORIES WITH SERIOUS COMORBIDITY AND BODY MASS INDEX (BMI) OF 38.0 TO 38.9 IN ADULT (HCC): ICD-10-CM

## 2024-03-01 DIAGNOSIS — E78.5 HYPERLIPIDEMIA, UNSPECIFIED HYPERLIPIDEMIA TYPE: ICD-10-CM

## 2024-03-01 SDOH — ECONOMIC STABILITY: INCOME INSECURITY: HOW HARD IS IT FOR YOU TO PAY FOR THE VERY BASICS LIKE FOOD, HOUSING, MEDICAL CARE, AND HEATING?: NOT HARD AT ALL

## 2024-03-01 SDOH — ECONOMIC STABILITY: FOOD INSECURITY: WITHIN THE PAST 12 MONTHS, THE FOOD YOU BOUGHT JUST DIDN'T LAST AND YOU DIDN'T HAVE MONEY TO GET MORE.: NEVER TRUE

## 2024-03-01 SDOH — ECONOMIC STABILITY: FOOD INSECURITY: WITHIN THE PAST 12 MONTHS, YOU WORRIED THAT YOUR FOOD WOULD RUN OUT BEFORE YOU GOT MONEY TO BUY MORE.: NEVER TRUE

## 2024-03-01 ASSESSMENT — ENCOUNTER SYMPTOMS
EYE ITCHING: 0
ABDOMINAL PAIN: 0
EYE DISCHARGE: 0
NAUSEA: 0
COUGH: 0
VOMITING: 0
SHORTNESS OF BREATH: 0
BACK PAIN: 0
RHINORRHEA: 0
CONSTIPATION: 0
TROUBLE SWALLOWING: 0
WHEEZING: 0
PHOTOPHOBIA: 0
VOICE CHANGE: 0
DIARRHEA: 0
SORE THROAT: 0

## 2024-03-01 ASSESSMENT — PATIENT HEALTH QUESTIONNAIRE - PHQ9
SUM OF ALL RESPONSES TO PHQ QUESTIONS 1-9: 0
3. TROUBLE FALLING OR STAYING ASLEEP: 0
6. FEELING BAD ABOUT YOURSELF - OR THAT YOU ARE A FAILURE OR HAVE LET YOURSELF OR YOUR FAMILY DOWN: 0
9. THOUGHTS THAT YOU WOULD BE BETTER OFF DEAD, OR OF HURTING YOURSELF: 0
SUM OF ALL RESPONSES TO PHQ9 QUESTIONS 1 & 2: 0
5. POOR APPETITE OR OVEREATING: 0
SUM OF ALL RESPONSES TO PHQ QUESTIONS 1-9: 0
SUM OF ALL RESPONSES TO PHQ QUESTIONS 1-9: 0
1. LITTLE INTEREST OR PLEASURE IN DOING THINGS: 0
4. FEELING TIRED OR HAVING LITTLE ENERGY: 0
SUM OF ALL RESPONSES TO PHQ QUESTIONS 1-9: 0
8. MOVING OR SPEAKING SO SLOWLY THAT OTHER PEOPLE COULD HAVE NOTICED. OR THE OPPOSITE, BEING SO FIGETY OR RESTLESS THAT YOU HAVE BEEN MOVING AROUND A LOT MORE THAN USUAL: 0
2. FEELING DOWN, DEPRESSED OR HOPELESS: 0
7. TROUBLE CONCENTRATING ON THINGS, SUCH AS READING THE NEWSPAPER OR WATCHING TELEVISION: 0
10. IF YOU CHECKED OFF ANY PROBLEMS, HOW DIFFICULT HAVE THESE PROBLEMS MADE IT FOR YOU TO DO YOUR WORK, TAKE CARE OF THINGS AT HOME, OR GET ALONG WITH OTHER PEOPLE: 0

## 2024-03-01 NOTE — PROGRESS NOTES
1. \"Have you been to the ER, urgent care clinic since your last visit?  Hospitalized since your last visit?\"  Yes, [facility]    2. \"Have you seen or consulted any other health care providers outside of the [facility] since your last visit?\"  Yes, [facility]      3. For patients aged 45-75: Has the patient had a colonoscopy / FIT/ Cologuard? NA - based on age

## 2024-03-01 NOTE — PROGRESS NOTES
3/1/2024    Aly Hughes (:  1980) is a 43 y.o. male, here for a preventive medicine evaluation.    Patient Active Problem List   Diagnosis    Other recurrent depressive disorders (HCC)    Gastroesophageal reflux disease    Acquired infertility in male    Chest pain, unspecified    Adjustment disorder with mixed anxiety and depressed mood    Bradycardia, unspecified    Infection following a procedure, unspecified, initial encounter    Irritable bowel syndrome    Low back pain, unspecified    Major depressive disorder, single episode, unspecified    Post-lumbar puncture headache    Myopia    Muscle strain    Male erectile disorder (CODE)    Posttraumatic stress disorder    Retention of urine    Retrograde ejaculation    Severe obesity with body mass index (BMI) of 35.0 to 39.9 with serious comorbidity (HCC)    Shoulder pain    Viral wart on finger    Sleep apnea       Review of Systems   Constitutional:  Negative for activity change, appetite change, fatigue and fever.   HENT:  Negative for congestion, hearing loss, postnasal drip, rhinorrhea, sore throat, trouble swallowing and voice change.    Eyes:  Negative for photophobia, discharge, itching and visual disturbance.   Respiratory:  Negative for cough, shortness of breath and wheezing.    Cardiovascular:  Negative for chest pain, palpitations and leg swelling.   Gastrointestinal:  Negative for abdominal pain, constipation, diarrhea, nausea and vomiting.   Genitourinary:  Negative for difficulty urinating and dysuria.   Musculoskeletal:  Negative for arthralgias and back pain.   Skin:  Negative for rash.   Neurological:  Negative for dizziness, weakness, light-headedness and headaches.   Psychiatric/Behavioral:  Negative for sleep disturbance. The patient is not nervous/anxious.        Prior to Visit Medications    Medication Sig Taking? Authorizing Provider   buPROPion (WELLBUTRIN XL) 300 MG extended release tablet  Yes Provider, MD Zhou

## 2024-03-01 NOTE — PATIENT INSTRUCTIONS
your doctor can help you overcome hurdles, reduce stress, or quit smoking.  Where can you learn more?  Go to https://www.[employer].net/patientEd and enter E882 to learn more about \"Learning About Changing a Habit by Setting Goals.\"  Current as of: June 25, 2023               Content Version: 13.9  © 2006-2023 [employer].   Care instructions adapted under license by [employer]. If you have questions about a medical condition or this instruction, always ask your healthcare professional. [employer] disclaims any warranty or liability for your use of this information.           A Healthy Lifestyle: Care Instructions  A healthy lifestyle can help you feel good, have more energy, and stay at a weight that's healthy for you. You can share a healthy lifestyle with your friends and family. And you can do it on your own.    Eat meals with your friends or family. You could try cooking together.   Plan activities with other people. Go for a walk with a friend, try a free online fitness class, or join a sports league.     Eat a variety of healthy foods. These include fruits, vegetables, whole grains, low-fat dairy, and lean protein.   Choose healthy portions of food. You can use the Nutrition Facts label on food packages as a guide.     Eat more fruits and vegetables. You could add vegetables to sandwiches or add fruit to cereal.   Drink water when you are thirsty. Limit soda, juice, and sports drinks.     Try to exercise most days. Aim for at least 2½ hours of exercise each week.   Keep moving. Work in the garden or take your dog on a walk. Use the stairs instead of the elevator.     If you use tobacco or nicotine, try to quit. Ask your doctor about programs and medicines to help you quit.   Limit alcohol. Men should have no more than 2 drinks a day. Women should have no more than 1. For some people, no alcohol is the best choice.   Follow-up care is a key part of your treatment and safety. Be

## 2025-02-27 ENCOUNTER — HOSPITAL ENCOUNTER (OUTPATIENT)
Facility: HOSPITAL | Age: 45
Setting detail: SPECIMEN
Discharge: HOME | End: 2025-03-02

## 2025-02-27 PROCEDURE — 99001 SPECIMEN HANDLING PT-LAB: CPT

## 2025-02-28 LAB
A/G RATIO: 2.1 RATIO (ref 1.1–2.6)
ALBUMIN: 4.4 G/DL (ref 3.5–5)
ALP BLD-CCNC: 69 U/L (ref 25–115)
ALT SERPL-CCNC: 17 U/L (ref 5–40)
ANION GAP SERPL CALCULATED.3IONS-SCNC: 9 MMOL/L (ref 3–15)
AST SERPL-CCNC: 14 U/L (ref 10–37)
BILIRUB SERPL-MCNC: 0.5 MG/DL (ref 0.2–1.2)
BUN BLDV-MCNC: 16 MG/DL (ref 6–22)
CALCIUM SERPL-MCNC: 8.9 MG/DL (ref 8.4–10.5)
CHLORIDE BLD-SCNC: 101 MMOL/L (ref 98–110)
CHOLESTEROL, TOTAL: 183 MG/DL (ref 110–200)
CHOLESTEROL/HDL RATIO: 4.9 (ref 0–5)
CO2: 30 MMOL/L (ref 20–32)
CREAT SERPL-MCNC: 1.1 MG/DL (ref 0.5–1.2)
GFR, ESTIMATED: >60 ML/MIN/1.73 SQ.M.
GLOBULIN: 2.1 G/DL (ref 2–4)
GLUCOSE: 83 MG/DL (ref 70–99)
HCT VFR BLD CALC: 44.2 % (ref 36.6–51.9)
HDLC SERPL-MCNC: 37 MG/DL
HEMOGLOBIN: 14.5 G/DL (ref 13.2–17.3)
LDL CHOLESTEROL: 132 MG/DL (ref 50–99)
LDL/HDL RATIO: 3.6
MCH RBC QN AUTO: 30 PG (ref 26–34)
MCHC RBC AUTO-ENTMCNC: 33 G/DL (ref 31–36)
MCV RBC AUTO: 92 FL (ref 80–95)
NON-HDL CHOLESTEROL: 146 MG/DL
PDW BLD-RTO: 13.5 % (ref 10–15.5)
PLATELET # BLD: 205 K/UL (ref 140–440)
PMV BLD AUTO: 11.2 FL (ref 9–13)
POTASSIUM SERPL-SCNC: 4.6 MMOL/L (ref 3.5–5.5)
RBC # BLD: 4.79 M/UL (ref 3.8–5.8)
SENTARA SPECIMEN COLLECTION: NORMAL
SODIUM BLD-SCNC: 140 MMOL/L (ref 133–145)
TOTAL PROTEIN: 6.5 G/DL (ref 6.4–8.3)
TRIGL SERPL-MCNC: 68 MG/DL (ref 40–149)
VLDLC SERPL CALC-MCNC: 14 MG/DL (ref 8–30)
WBC # BLD: 4.2 K/UL (ref 4–11)

## 2025-03-03 ENCOUNTER — OFFICE VISIT (OUTPATIENT)
Facility: CLINIC | Age: 45
End: 2025-03-03

## 2025-03-03 VITALS
OXYGEN SATURATION: 98 % | RESPIRATION RATE: 18 BRPM | WEIGHT: 184 LBS | HEIGHT: 66 IN | BODY MASS INDEX: 29.57 KG/M2 | HEART RATE: 56 BPM | DIASTOLIC BLOOD PRESSURE: 65 MMHG | SYSTOLIC BLOOD PRESSURE: 106 MMHG | TEMPERATURE: 96.6 F

## 2025-03-03 DIAGNOSIS — E66.3 OVERWEIGHT WITH BODY MASS INDEX (BMI) OF 29 TO 29.9 IN ADULT: ICD-10-CM

## 2025-03-03 DIAGNOSIS — G47.00 INSOMNIA, UNSPECIFIED TYPE: ICD-10-CM

## 2025-03-03 DIAGNOSIS — F33.1 MDD (MAJOR DEPRESSIVE DISORDER), RECURRENT EPISODE, MODERATE (HCC): ICD-10-CM

## 2025-03-03 DIAGNOSIS — M25.511 RIGHT SHOULDER PAIN, UNSPECIFIED CHRONICITY: ICD-10-CM

## 2025-03-03 DIAGNOSIS — Z00.00 ENCOUNTER FOR WELL ADULT EXAM WITHOUT ABNORMAL FINDINGS: Primary | ICD-10-CM

## 2025-03-03 DIAGNOSIS — E78.5 HYPERLIPIDEMIA, UNSPECIFIED HYPERLIPIDEMIA TYPE: ICD-10-CM

## 2025-03-03 PROBLEM — E66.01 SEVERE OBESITY WITH BODY MASS INDEX (BMI) OF 35.0 TO 39.9 WITH SERIOUS COMORBIDITY: Status: RESOLVED | Noted: 2018-06-13 | Resolved: 2025-03-03

## 2025-03-03 PROBLEM — F33.8 OTHER RECURRENT DEPRESSIVE DISORDERS: Status: RESOLVED | Noted: 2018-06-13 | Resolved: 2025-03-03

## 2025-03-03 SDOH — ECONOMIC STABILITY: FOOD INSECURITY: WITHIN THE PAST 12 MONTHS, YOU WORRIED THAT YOUR FOOD WOULD RUN OUT BEFORE YOU GOT MONEY TO BUY MORE.: NEVER TRUE

## 2025-03-03 SDOH — ECONOMIC STABILITY: FOOD INSECURITY: WITHIN THE PAST 12 MONTHS, THE FOOD YOU BOUGHT JUST DIDN'T LAST AND YOU DIDN'T HAVE MONEY TO GET MORE.: NEVER TRUE

## 2025-03-03 ASSESSMENT — ENCOUNTER SYMPTOMS
RHINORRHEA: 0
VOICE CHANGE: 0
DIARRHEA: 0
NAUSEA: 0
VOMITING: 0
BACK PAIN: 0
WHEEZING: 0
ABDOMINAL PAIN: 0
PHOTOPHOBIA: 0
COUGH: 0
SORE THROAT: 0
EYE ITCHING: 0
TROUBLE SWALLOWING: 0
CONSTIPATION: 0
SHORTNESS OF BREATH: 0
EYE DISCHARGE: 0

## 2025-03-03 ASSESSMENT — PATIENT HEALTH QUESTIONNAIRE - PHQ9
SUM OF ALL RESPONSES TO PHQ QUESTIONS 1-9: 3
5. POOR APPETITE OR OVEREATING: NOT AT ALL
8. MOVING OR SPEAKING SO SLOWLY THAT OTHER PEOPLE COULD HAVE NOTICED. OR THE OPPOSITE, BEING SO FIGETY OR RESTLESS THAT YOU HAVE BEEN MOVING AROUND A LOT MORE THAN USUAL: NOT AT ALL
10. IF YOU CHECKED OFF ANY PROBLEMS, HOW DIFFICULT HAVE THESE PROBLEMS MADE IT FOR YOU TO DO YOUR WORK, TAKE CARE OF THINGS AT HOME, OR GET ALONG WITH OTHER PEOPLE: NOT DIFFICULT AT ALL
SUM OF ALL RESPONSES TO PHQ QUESTIONS 1-9: 3
4. FEELING TIRED OR HAVING LITTLE ENERGY: NOT AT ALL
SUM OF ALL RESPONSES TO PHQ QUESTIONS 1-9: 3
6. FEELING BAD ABOUT YOURSELF - OR THAT YOU ARE A FAILURE OR HAVE LET YOURSELF OR YOUR FAMILY DOWN: NOT AT ALL
2. FEELING DOWN, DEPRESSED OR HOPELESS: NEARLY EVERY DAY
3. TROUBLE FALLING OR STAYING ASLEEP: NOT AT ALL
1. LITTLE INTEREST OR PLEASURE IN DOING THINGS: NOT AT ALL
9. THOUGHTS THAT YOU WOULD BE BETTER OFF DEAD, OR OF HURTING YOURSELF: NOT AT ALL
SUM OF ALL RESPONSES TO PHQ QUESTIONS 1-9: 3
7. TROUBLE CONCENTRATING ON THINGS, SUCH AS READING THE NEWSPAPER OR WATCHING TELEVISION: NOT AT ALL

## 2025-03-03 NOTE — PROGRESS NOTES
Well Adult Note  Name: Aly Hughes Today’s Date: 3/3/2025   MRN: [id number] Sex: Male   Age: 44 y.o. Ethnicity: Non- of /a   : 1980 Race: White (non-)      Aly Hughes is here for a well adult exam.          Assessment & Plan  1. Weight management.  His weight has shown a significant decrease, with a reduction of 56 pounds. His current weight is 166 pounds, and his height is 66 inches. The target weight is set at 160 pounds. He was advised to incorporate weight training into his exercise regimen to promote muscle gain. He was also encouraged to continue his current lifestyle modifications, including diet and exercise.    2. Right shoulder pain.  He was provided with a list of home exercises specifically designed for shoulder pain management. [facility] and inform us of [facility] response.    3. MDD and anxiety.  Stable he is currently taking bupropion  mg q. for depression and anxiety, and trazodone. [facility].    4. Sleep issues.  He is currently taking trazodone for sleep, [facility] by the [facility].    5. Health maintenance.  He received a tetanus shot in  and is due for another in . He is due for a hepatitis B vaccine to complete the series. He had colon surgery in  due to diverticulitis. He was advised to obtain his [facility] records [facility] the [facility] for further review.    6. Medication management.  He is currently taking cetirizine, bupropion, trazodone, and sildenafil. He has a prescription for 800 mg ibuprofen [facility], which he takes as needed for his shoulder pain.    Follow-up  The patient will follow up in 1 year, or earlier if necessary.    PROCEDURE  The patient underwent colon surgery in , during which 9 inches of his colon were removed due to diverticulitis.    Encounter for well adult exam without abnormal findings  MDD (major depressive disorder), recurrent episode,

## 2025-03-03 NOTE — PATIENT INSTRUCTIONS
even more important if you have health problems like kidney disease, heart disease, or diabetes. Your doctor may suggest that you meet with a registered dietitian. A dietitian can help you make an eating plan that works for you.  What foods do you eat on a low-carb diet?  On a low-carb diet, you choose foods that are high in protein and fat. Examples of these are:  Meat, poultry, and fish.  Eggs.  Nuts, such as walnuts, pecans, almonds, and peanuts.  Peanut butter and other nut butters.  Tofu.  Avocado.  Olives.  Non-starchy vegetables like broccoli, cauliflower, green beans, mushrooms, peppers, lettuce, and spinach.  Unsweetened non-dairy milks like almond milk and coconut milk.  Cheese, cottage cheese, and cream cheese.  Where can you learn more?  Go to https://www.[employer].net/patientEd and enter C335 to learn more about \"Learning About Low-Carbohydrate Diets.\"  Current as of: September 20, 2023  Content Version: 14.3  © 2024 [employer].   Care instructions adapted under license by [employer]. If you have questions about a medical condition or this instruction, always ask your healthcare professional. [employer] disclaims any warranty or liability for your use of this information.

## 2025-07-31 ENCOUNTER — OFFICE VISIT (OUTPATIENT)
Facility: CLINIC | Age: 45
End: 2025-07-31
Payer: COMMERCIAL

## 2025-07-31 VITALS
WEIGHT: 203 LBS | HEIGHT: 66 IN | SYSTOLIC BLOOD PRESSURE: 105 MMHG | DIASTOLIC BLOOD PRESSURE: 66 MMHG | TEMPERATURE: 97.1 F | BODY MASS INDEX: 32.62 KG/M2 | RESPIRATION RATE: 18 BRPM | HEART RATE: 56 BPM | OXYGEN SATURATION: 96 %

## 2025-07-31 DIAGNOSIS — F33.1 MDD (MAJOR DEPRESSIVE DISORDER), RECURRENT EPISODE, MODERATE (HCC): ICD-10-CM

## 2025-07-31 DIAGNOSIS — F43.23 ADJUSTMENT DISORDER WITH MIXED ANXIETY AND DEPRESSED MOOD: ICD-10-CM

## 2025-07-31 DIAGNOSIS — N50.89 TESTICULAR LUMP: Primary | ICD-10-CM

## 2025-07-31 DIAGNOSIS — F43.10 POSTTRAUMATIC STRESS DISORDER: ICD-10-CM

## 2025-07-31 PROCEDURE — 99213 OFFICE O/P EST LOW 20 MIN: CPT | Performed by: STUDENT IN AN ORGANIZED HEALTH CARE EDUCATION/TRAINING PROGRAM

## 2025-07-31 RX ORDER — PANTOPRAZOLE SODIUM 40 MG/1
40 TABLET, DELAYED RELEASE ORAL
COMMUNITY
Start: 2025-05-30

## 2025-07-31 RX ORDER — TRAZODONE HYDROCHLORIDE 50 MG/1
50 TABLET ORAL
COMMUNITY
Start: 2025-07-29

## 2025-07-31 ASSESSMENT — ENCOUNTER SYMPTOMS
DIARRHEA: 0
PHOTOPHOBIA: 0
WHEEZING: 0
ABDOMINAL PAIN: 0
SHORTNESS OF BREATH: 0
COUGH: 0
VOICE CHANGE: 0
TROUBLE SWALLOWING: 0
EYE DISCHARGE: 0
CONSTIPATION: 0
EYE ITCHING: 0
VOMITING: 0
SORE THROAT: 0
RHINORRHEA: 0
BACK PAIN: 0
NAUSEA: 0

## 2025-07-31 ASSESSMENT — PATIENT HEALTH QUESTIONNAIRE - PHQ9
1. LITTLE INTEREST OR PLEASURE IN DOING THINGS: NOT AT ALL
SUM OF ALL RESPONSES TO PHQ QUESTIONS 1-9: 0
2. FEELING DOWN, DEPRESSED OR HOPELESS: NOT AT ALL

## 2025-07-31 NOTE — PROGRESS NOTES
Aly Hughes (:  1980) is a 45 y.o. male, Established patient, here for evaluation of the following chief complaint(s):  Mass (Patient c/o painful mass on left side of testicle x's 3 days. )         Assessment & Plan  1. Lump on left testicle  - Reports a hard, painful lump on the left testicle, noticed this past weekend  - Physical examination reveals tenderness upon palpation of the lump  - No history of similar symptoms or discharge; no known family history of testicular cancer  - Ultrasound of the left testicle ordered for further investigation; advised to go to [facility] ultrasound      2. MDD and anxiety.  Stable he is currently taking bupropion  mg q. for depression and anxiety, and trazodone. [facility].    Results    1. Testicular lump  -     US SCROTUM AND TESTICLES; Future  2. MDD (major depressive disorder), recurrent episode, moderate (HCC)  3. Adjustment disorder with mixed anxiety and depressed mood  4. Posttraumatic stress disorder    No follow-ups on file.       Subjective   History of Present Illness  The patient presents for a lump on his left testicle, and follow-up of his mental health issues.    He discovered a hard, painful lump on his left testicle over the past weekend. The pain is described as a dull ache that intensifies at times. This is his first experience with such a symptom. He reports no discharge from the area. He has previously undergone a vasectomy and experiences retrograde ejaculation. He is currently on Wellbutrin and has an adequate supply of this medication.    FAMILY HISTORY  - No history of testicular cancer on mother's side  - Unsure about father's side due to lack of contact    Review of Systems   Constitutional:  Negative for activity change, appetite change, fatigue and fever.   HENT:  Negative for congestion, hearing loss, postnasal drip, rhinorrhea, sore throat, trouble swallowing and voice change.    Eyes:

## 2025-08-01 ENCOUNTER — HOSPITAL ENCOUNTER (OUTPATIENT)
Facility: HOSPITAL | Age: 45
Discharge: HOME | End: 2025-08-01
Attending: STUDENT IN AN ORGANIZED HEALTH CARE EDUCATION/TRAINING PROGRAM
Payer: COMMERCIAL

## 2025-08-01 DIAGNOSIS — N50.89 TESTICULAR LUMP: ICD-10-CM

## 2025-08-01 PROCEDURE — 93976 VASCULAR STUDY: CPT

## 2025-08-06 ENCOUNTER — PATIENT MESSAGE (OUTPATIENT)
Facility: CLINIC | Age: 45
End: 2025-08-06

## (undated) DEVICE — TRAY PREP DRY W/ PREM GLV 2 APPL 6 SPNG 2 UNDPD 1 OVERWRAP

## (undated) DEVICE — MEDI-VAC NON-CONDUCTIVE SUCTION TUBING: Brand: CARDINAL HEALTH

## (undated) DEVICE — CATH URET 5FRX70CM W/OPN END -- BX/20

## (undated) DEVICE — GDWIRE URET STR STD .038X150 -- ZIPWIRE STD

## (undated) DEVICE — SKIN MARKER,REGULAR TIP WITH RULER AND LABELS: Brand: DEVON

## (undated) DEVICE — GAUZE,SPONGE,4"X4",16PLY,STRL,LF,10/TRAY: Brand: MEDLINE

## (undated) DEVICE — Y-TYPE TUR/BLADDER IRRIGATION SET, REGULATING CLAMP

## (undated) DEVICE — SOL IRR STRL H2O 3000ML BG -- USE ITEM 173640

## (undated) DEVICE — LUB SURG MEDC STRL 2OZ TUBE MC -- MEDICHOICE

## (undated) DEVICE — 4-PORT MANIFOLD: Brand: NEPTUNE 2

## (undated) DEVICE — DRAPE SURG L39XW46IN PERI OB

## (undated) DEVICE — SOL IRR SOD CL 0.9% 1000ML BTL --

## (undated) DEVICE — PACK SURG BSHR MIN BSIN PK

## (undated) DEVICE — EVACUATOR URO BLDR W/ ADPT UROVAC

## (undated) DEVICE — HEX-LOCKING BLADE ELECTRODE: Brand: EDGE

## (undated) DEVICE — KNIFE,COLD,STERILE,STRAIGHT: Brand: N.A.

## (undated) DEVICE — SOLUTION IV 1000ML 0.9% SOD CHL

## (undated) DEVICE — GARMENT,MEDLINE,DVT,INT,CALF,MED, GEN2: Brand: MEDLINE

## (undated) DEVICE — DRAPE TWL SURG 16X26IN BLU ORB04] ALLCARE INC]

## (undated) DEVICE — BLANKET WRM AD W50XL85.8IN PACU FULL BODY FORC AIR

## (undated) DEVICE — STERILE POLYISOPRENE POWDER-FREE SURGICAL GLOVES: Brand: PROTEXIS

## (undated) DEVICE — TUBING IRRIG L77IN DIA0.241IN L BOR FOR CYSTO W/ NVENT

## (undated) DEVICE — INTENDED FOR TISSUE SEPARATION, AND OTHER PROCEDURES THAT REQUIRE A SHARP SURGICAL BLADE TO PUNCTURE OR CUT.: Brand: BARD-PARKER SAFETY BLADES SIZE 10, STERILE

## (undated) DEVICE — BAG DRNGE 19OZ VYN LEG RUB CAP ANTIREFLX VLV LEAK

## (undated) DEVICE — SOLUTION IRRIG 3000ML 0.9% SOD CHL FLX CONT 0797208] ICU MEDICAL INC]

## (undated) DEVICE — Z DUP USE 2565107 PACK SURG PROC LEG CYSTO T-DRAPE REINF TBL CVR HND TWL

## (undated) DEVICE — SYR 10ML LUER LOK 1/5ML GRAD --

## (undated) DEVICE — FLEX ADVANTAGE 3000CC: Brand: FLEX ADVANTAGE

## (undated) DEVICE — BLADE ASSEMB CLP HAIR FINE --

## (undated) DEVICE — STER SINGLE BASIN SET W/BOWLS: Brand: CARDINAL HEALTH

## (undated) DEVICE — GOWN,PREVENTION PLUS,XLN/XL,ST,24/CS: Brand: MEDLINE

## (undated) DEVICE — KIT CLN UP BON SECOURS MARYV

## (undated) DEVICE — BAG DRAINAGE CUST DISP